# Patient Record
Sex: MALE | HISPANIC OR LATINO | Employment: OTHER | URBAN - METROPOLITAN AREA
[De-identification: names, ages, dates, MRNs, and addresses within clinical notes are randomized per-mention and may not be internally consistent; named-entity substitution may affect disease eponyms.]

---

## 2024-07-31 ENCOUNTER — APPOINTMENT (OUTPATIENT)
Dept: GENERAL RADIOLOGY | Facility: CLINIC | Age: 39
End: 2024-07-31
Attending: EMERGENCY MEDICINE
Payer: MEDICAID

## 2024-07-31 ENCOUNTER — APPOINTMENT (OUTPATIENT)
Dept: CT IMAGING | Facility: CLINIC | Age: 39
End: 2024-07-31
Attending: EMERGENCY MEDICINE
Payer: MEDICAID

## 2024-07-31 ENCOUNTER — APPOINTMENT (OUTPATIENT)
Dept: ULTRASOUND IMAGING | Facility: CLINIC | Age: 39
End: 2024-07-31
Attending: EMERGENCY MEDICINE
Payer: MEDICAID

## 2024-07-31 ENCOUNTER — HOSPITAL ENCOUNTER (INPATIENT)
Facility: CLINIC | Age: 39
LOS: 7 days | Discharge: HOME | End: 2024-08-07
Attending: EMERGENCY MEDICINE | Admitting: FAMILY MEDICINE
Payer: MEDICAID

## 2024-07-31 DIAGNOSIS — M79.642 LEFT HAND PAIN: ICD-10-CM

## 2024-07-31 DIAGNOSIS — J18.9 PARAPNEUMONIC EFFUSION: ICD-10-CM

## 2024-07-31 DIAGNOSIS — J18.9 PNEUMONIA OF RIGHT LOWER LOBE DUE TO INFECTIOUS ORGANISM: Primary | ICD-10-CM

## 2024-07-31 DIAGNOSIS — J91.8 PARAPNEUMONIC EFFUSION: ICD-10-CM

## 2024-07-31 DIAGNOSIS — R05.9 COUGH, UNSPECIFIED TYPE: ICD-10-CM

## 2024-07-31 DIAGNOSIS — A41.9 SEPSIS, DUE TO UNSPECIFIED ORGANISM, UNSPECIFIED WHETHER ACUTE ORGAN DYSFUNCTION PRESENT (H): ICD-10-CM

## 2024-07-31 DIAGNOSIS — R06.02 SHORTNESS OF BREATH: ICD-10-CM

## 2024-07-31 DIAGNOSIS — R07.81 PLEURITIC CHEST PAIN: ICD-10-CM

## 2024-07-31 LAB
ALBUMIN SERPL BCG-MCNC: 3.8 G/DL (ref 3.5–5.2)
ALBUMIN UR-MCNC: 30 MG/DL
ALP SERPL-CCNC: 73 U/L (ref 40–150)
ALT SERPL W P-5'-P-CCNC: 22 U/L (ref 0–70)
ANION GAP SERPL CALCULATED.3IONS-SCNC: 12 MMOL/L (ref 7–15)
APPEARANCE UR: CLEAR
AST SERPL W P-5'-P-CCNC: 17 U/L (ref 0–45)
ATRIAL RATE - MUSE: 92 BPM
BASOPHILS # BLD AUTO: 0 10E3/UL (ref 0–0.2)
BASOPHILS NFR BLD AUTO: 0 %
BILIRUB SERPL-MCNC: 2 MG/DL
BILIRUB UR QL STRIP: NEGATIVE
BUN SERPL-MCNC: 8.2 MG/DL (ref 6–20)
CALCIUM SERPL-MCNC: 8.5 MG/DL (ref 8.8–10.4)
CHLORIDE SERPL-SCNC: 102 MMOL/L (ref 98–107)
COLOR UR AUTO: YELLOW
CREAT SERPL-MCNC: 0.82 MG/DL (ref 0.67–1.17)
DIASTOLIC BLOOD PRESSURE - MUSE: NORMAL MMHG
EGFRCR SERPLBLD CKD-EPI 2021: >90 ML/MIN/1.73M2
EOSINOPHIL # BLD AUTO: 0.5 10E3/UL (ref 0–0.7)
EOSINOPHIL NFR BLD AUTO: 5 %
ERYTHROCYTE [DISTWIDTH] IN BLOOD BY AUTOMATED COUNT: 13.4 % (ref 10–15)
FLUAV RNA SPEC QL NAA+PROBE: NEGATIVE
FLUBV RNA RESP QL NAA+PROBE: NEGATIVE
GLUCOSE SERPL-MCNC: 110 MG/DL (ref 70–99)
GLUCOSE UR STRIP-MCNC: NEGATIVE MG/DL
HCO3 SERPL-SCNC: 21 MMOL/L (ref 22–29)
HCT VFR BLD AUTO: 40.4 % (ref 40–53)
HGB BLD-MCNC: 13.7 G/DL (ref 13.3–17.7)
HGB UR QL STRIP: NEGATIVE
HIV 1+2 AB+HIV1 P24 AG SERPL QL IA: NONREACTIVE
HOLD SPECIMEN: NORMAL
HOLD SPECIMEN: NORMAL
IMM GRANULOCYTES # BLD: 0.1 10E3/UL
IMM GRANULOCYTES NFR BLD: 1 %
INTERPRETATION ECG - MUSE: NORMAL
KETONES UR STRIP-MCNC: NEGATIVE MG/DL
LACTATE SERPL-SCNC: 0.9 MMOL/L (ref 0.7–2)
LEUKOCYTE ESTERASE UR QL STRIP: NEGATIVE
LYMPHOCYTES # BLD AUTO: 0.8 10E3/UL (ref 0.8–5.3)
LYMPHOCYTES NFR BLD AUTO: 7 %
MCH RBC QN AUTO: 30.5 PG (ref 26.5–33)
MCHC RBC AUTO-ENTMCNC: 33.9 G/DL (ref 31.5–36.5)
MCV RBC AUTO: 90 FL (ref 78–100)
MONOCYTES # BLD AUTO: 0.9 10E3/UL (ref 0–1.3)
MONOCYTES NFR BLD AUTO: 7 %
NEUTROPHILS # BLD AUTO: 9.6 10E3/UL (ref 1.6–8.3)
NEUTROPHILS NFR BLD AUTO: 80 %
NITRATE UR QL: NEGATIVE
NRBC # BLD AUTO: 0 10E3/UL
NRBC BLD AUTO-RTO: 0 /100
NT-PROBNP SERPL-MCNC: 94 PG/ML (ref 0–450)
P AXIS - MUSE: 47 DEGREES
PH UR STRIP: 6.5 [PH] (ref 5–7)
PLATELET # BLD AUTO: 332 10E3/UL (ref 150–450)
POTASSIUM SERPL-SCNC: 3.5 MMOL/L (ref 3.4–5.3)
PR INTERVAL - MUSE: 136 MS
PROT SERPL-MCNC: 7.7 G/DL (ref 6.4–8.3)
QRS DURATION - MUSE: 82 MS
QT - MUSE: 362 MS
QTC - MUSE: 447 MS
R AXIS - MUSE: 81 DEGREES
RBC # BLD AUTO: 4.49 10E6/UL (ref 4.4–5.9)
RBC URINE: 0 /HPF
RSV RNA SPEC NAA+PROBE: NEGATIVE
SARS-COV-2 RNA RESP QL NAA+PROBE: NEGATIVE
SODIUM SERPL-SCNC: 135 MMOL/L (ref 135–145)
SP GR UR STRIP: 1 (ref 1–1.03)
SYSTOLIC BLOOD PRESSURE - MUSE: NORMAL MMHG
T AXIS - MUSE: 53 DEGREES
TROPONIN T SERPL HS-MCNC: <6 NG/L
UROBILINOGEN UR STRIP-MCNC: 12 MG/DL
VENTRICULAR RATE- MUSE: 92 BPM
WBC # BLD AUTO: 11.9 10E3/UL (ref 4–11)
WBC URINE: 0 /HPF

## 2024-07-31 PROCEDURE — 99223 1ST HOSP IP/OBS HIGH 75: CPT | Mod: AI

## 2024-07-31 PROCEDURE — 120N000002 HC R&B MED SURG/OB UMMC

## 2024-07-31 PROCEDURE — 84484 ASSAY OF TROPONIN QUANT: CPT | Performed by: EMERGENCY MEDICINE

## 2024-07-31 PROCEDURE — 99291 CRITICAL CARE FIRST HOUR: CPT | Mod: 25 | Performed by: EMERGENCY MEDICINE

## 2024-07-31 PROCEDURE — 85025 COMPLETE CBC W/AUTO DIFF WBC: CPT | Performed by: EMERGENCY MEDICINE

## 2024-07-31 PROCEDURE — 83605 ASSAY OF LACTIC ACID: CPT | Performed by: EMERGENCY MEDICINE

## 2024-07-31 PROCEDURE — 250N000011 HC RX IP 250 OP 636: Performed by: STUDENT IN AN ORGANIZED HEALTH CARE EDUCATION/TRAINING PROGRAM

## 2024-07-31 PROCEDURE — 93005 ELECTROCARDIOGRAM TRACING: CPT | Performed by: EMERGENCY MEDICINE

## 2024-07-31 PROCEDURE — 71275 CT ANGIOGRAPHY CHEST: CPT | Mod: 26 | Performed by: RADIOLOGY

## 2024-07-31 PROCEDURE — 83880 ASSAY OF NATRIURETIC PEPTIDE: CPT | Performed by: EMERGENCY MEDICINE

## 2024-07-31 PROCEDURE — 93010 ELECTROCARDIOGRAM REPORT: CPT | Performed by: EMERGENCY MEDICINE

## 2024-07-31 PROCEDURE — 36415 COLL VENOUS BLD VENIPUNCTURE: CPT | Performed by: EMERGENCY MEDICINE

## 2024-07-31 PROCEDURE — 74177 CT ABD & PELVIS W/CONTRAST: CPT | Mod: 26 | Performed by: RADIOLOGY

## 2024-07-31 PROCEDURE — 82040 ASSAY OF SERUM ALBUMIN: CPT | Performed by: EMERGENCY MEDICINE

## 2024-07-31 PROCEDURE — 250N000011 HC RX IP 250 OP 636

## 2024-07-31 PROCEDURE — 76705 ECHO EXAM OF ABDOMEN: CPT | Mod: 26 | Performed by: STUDENT IN AN ORGANIZED HEALTH CARE EDUCATION/TRAINING PROGRAM

## 2024-07-31 PROCEDURE — 87637 SARSCOV2&INF A&B&RSV AMP PRB: CPT | Performed by: EMERGENCY MEDICINE

## 2024-07-31 PROCEDURE — 86481 TB AG RESPONSE T-CELL SUSP: CPT

## 2024-07-31 PROCEDURE — 96365 THER/PROPH/DIAG IV INF INIT: CPT | Mod: 59 | Performed by: EMERGENCY MEDICINE

## 2024-07-31 PROCEDURE — 71046 X-RAY EXAM CHEST 2 VIEWS: CPT | Mod: 26 | Performed by: RADIOLOGY

## 2024-07-31 PROCEDURE — 87389 HIV-1 AG W/HIV-1&-2 AB AG IA: CPT

## 2024-07-31 PROCEDURE — 71046 X-RAY EXAM CHEST 2 VIEWS: CPT

## 2024-07-31 PROCEDURE — 71275 CT ANGIOGRAPHY CHEST: CPT

## 2024-07-31 PROCEDURE — 87040 BLOOD CULTURE FOR BACTERIA: CPT | Performed by: EMERGENCY MEDICINE

## 2024-07-31 PROCEDURE — 250N000011 HC RX IP 250 OP 636: Performed by: EMERGENCY MEDICINE

## 2024-07-31 PROCEDURE — 36415 COLL VENOUS BLD VENIPUNCTURE: CPT

## 2024-07-31 PROCEDURE — 250N000013 HC RX MED GY IP 250 OP 250 PS 637

## 2024-07-31 PROCEDURE — 250N000013 HC RX MED GY IP 250 OP 250 PS 637: Performed by: EMERGENCY MEDICINE

## 2024-07-31 PROCEDURE — 87086 URINE CULTURE/COLONY COUNT: CPT

## 2024-07-31 PROCEDURE — 258N000003 HC RX IP 258 OP 636

## 2024-07-31 PROCEDURE — 74177 CT ABD & PELVIS W/CONTRAST: CPT

## 2024-07-31 PROCEDURE — 81001 URINALYSIS AUTO W/SCOPE: CPT | Performed by: EMERGENCY MEDICINE

## 2024-07-31 PROCEDURE — 76705 ECHO EXAM OF ABDOMEN: CPT

## 2024-07-31 RX ORDER — ACETAMINOPHEN 650 MG/1
650 SUPPOSITORY RECTAL EVERY 8 HOURS
Status: DISCONTINUED | OUTPATIENT
Start: 2024-07-31 | End: 2024-08-07 | Stop reason: HOSPADM

## 2024-07-31 RX ORDER — AMOXICILLIN 250 MG
2 CAPSULE ORAL 2 TIMES DAILY PRN
Status: DISCONTINUED | OUTPATIENT
Start: 2024-07-31 | End: 2024-08-07 | Stop reason: HOSPADM

## 2024-07-31 RX ORDER — GUAIFENESIN/DEXTROMETHORPHAN 100-10MG/5
10 SYRUP ORAL EVERY 4 HOURS PRN
Status: DISCONTINUED | OUTPATIENT
Start: 2024-07-31 | End: 2024-08-07 | Stop reason: HOSPADM

## 2024-07-31 RX ORDER — ACETAMINOPHEN 325 MG/1
650 TABLET ORAL ONCE
Status: COMPLETED | OUTPATIENT
Start: 2024-07-31 | End: 2024-07-31

## 2024-07-31 RX ORDER — AMOXICILLIN 250 MG
1 CAPSULE ORAL 2 TIMES DAILY PRN
Status: DISCONTINUED | OUTPATIENT
Start: 2024-07-31 | End: 2024-08-07 | Stop reason: HOSPADM

## 2024-07-31 RX ORDER — KETOROLAC TROMETHAMINE 15 MG/ML
15 INJECTION, SOLUTION INTRAMUSCULAR; INTRAVENOUS EVERY 6 HOURS
Status: DISCONTINUED | OUTPATIENT
Start: 2024-07-31 | End: 2024-08-02

## 2024-07-31 RX ORDER — IOPAMIDOL 755 MG/ML
132 INJECTION, SOLUTION INTRAVASCULAR ONCE
Status: COMPLETED | OUTPATIENT
Start: 2024-07-31 | End: 2024-07-31

## 2024-07-31 RX ORDER — ONDANSETRON 4 MG/1
4 TABLET, ORALLY DISINTEGRATING ORAL EVERY 6 HOURS PRN
Status: DISCONTINUED | OUTPATIENT
Start: 2024-07-31 | End: 2024-08-02

## 2024-07-31 RX ORDER — CEFTRIAXONE 2 G/1
2 INJECTION, POWDER, FOR SOLUTION INTRAMUSCULAR; INTRAVENOUS ONCE
Status: COMPLETED | OUTPATIENT
Start: 2024-07-31 | End: 2024-07-31

## 2024-07-31 RX ORDER — ACETAMINOPHEN 325 MG/1
650 TABLET ORAL EVERY 4 HOURS PRN
Status: DISCONTINUED | OUTPATIENT
Start: 2024-07-31 | End: 2024-07-31

## 2024-07-31 RX ORDER — CALCIUM CARBONATE 500 MG/1
1000 TABLET, CHEWABLE ORAL 4 TIMES DAILY PRN
Status: DISCONTINUED | OUTPATIENT
Start: 2024-07-31 | End: 2024-08-07 | Stop reason: HOSPADM

## 2024-07-31 RX ORDER — ONDANSETRON 2 MG/ML
4 INJECTION INTRAMUSCULAR; INTRAVENOUS EVERY 6 HOURS PRN
Status: DISCONTINUED | OUTPATIENT
Start: 2024-07-31 | End: 2024-08-02

## 2024-07-31 RX ORDER — CEFTRIAXONE 2 G/1
2 INJECTION, POWDER, FOR SOLUTION INTRAMUSCULAR; INTRAVENOUS EVERY 24 HOURS
Status: DISCONTINUED | OUTPATIENT
Start: 2024-08-01 | End: 2024-08-01

## 2024-07-31 RX ORDER — AZITHROMYCIN 500 MG/5ML
500 INJECTION, POWDER, LYOPHILIZED, FOR SOLUTION INTRAVENOUS EVERY 24 HOURS
Status: DISCONTINUED | OUTPATIENT
Start: 2024-08-01 | End: 2024-08-01

## 2024-07-31 RX ORDER — ALBUTEROL SULFATE 0.83 MG/ML
2.5 SOLUTION RESPIRATORY (INHALATION) EVERY 4 HOURS PRN
Status: DISCONTINUED | OUTPATIENT
Start: 2024-07-31 | End: 2024-08-02

## 2024-07-31 RX ORDER — AZITHROMYCIN 250 MG/1
500 TABLET, FILM COATED ORAL ONCE
Status: COMPLETED | OUTPATIENT
Start: 2024-07-31 | End: 2024-07-31

## 2024-07-31 RX ORDER — ACETAMINOPHEN 325 MG/1
975 TABLET ORAL EVERY 8 HOURS
Status: DISCONTINUED | OUTPATIENT
Start: 2024-07-31 | End: 2024-08-07 | Stop reason: HOSPADM

## 2024-07-31 RX ORDER — LIDOCAINE 40 MG/G
CREAM TOPICAL
Status: DISCONTINUED | OUTPATIENT
Start: 2024-07-31 | End: 2024-08-07 | Stop reason: HOSPADM

## 2024-07-31 RX ADMIN — KETOROLAC TROMETHAMINE 15 MG: 15 INJECTION, SOLUTION INTRAMUSCULAR; INTRAVENOUS at 22:11

## 2024-07-31 RX ADMIN — AZITHROMYCIN DIHYDRATE 500 MG: 250 TABLET ORAL at 16:25

## 2024-07-31 RX ADMIN — CEFTRIAXONE SODIUM 2 G: 2 INJECTION, POWDER, FOR SOLUTION INTRAMUSCULAR; INTRAVENOUS at 12:56

## 2024-07-31 RX ADMIN — IOPAMIDOL 132 ML: 755 INJECTION, SOLUTION INTRAVENOUS at 14:53

## 2024-07-31 RX ADMIN — ACETAMINOPHEN 650 MG: 325 TABLET, FILM COATED ORAL at 11:43

## 2024-07-31 RX ADMIN — SODIUM CHLORIDE 1000 ML: 9 INJECTION, SOLUTION INTRAVENOUS at 22:12

## 2024-07-31 RX ADMIN — ACETAMINOPHEN 975 MG: 325 TABLET, FILM COATED ORAL at 22:10

## 2024-07-31 ASSESSMENT — ACTIVITIES OF DAILY LIVING (ADL)
ADLS_ACUITY_SCORE: 35
ADLS_ACUITY_SCORE: 35
ADLS_ACUITY_SCORE: 18
ADLS_ACUITY_SCORE: 35
ADLS_ACUITY_SCORE: 18
ADLS_ACUITY_SCORE: 35
ADLS_ACUITY_SCORE: 18
ADLS_ACUITY_SCORE: 35

## 2024-07-31 ASSESSMENT — COLUMBIA-SUICIDE SEVERITY RATING SCALE - C-SSRS
2. HAVE YOU ACTUALLY HAD ANY THOUGHTS OF KILLING YOURSELF IN THE PAST MONTH?: NO
1. IN THE PAST MONTH, HAVE YOU WISHED YOU WERE DEAD OR WISHED YOU COULD GO TO SLEEP AND NOT WAKE UP?: NO
6. HAVE YOU EVER DONE ANYTHING, STARTED TO DO ANYTHING, OR PREPARED TO DO ANYTHING TO END YOUR LIFE?: NO

## 2024-07-31 NOTE — LETTER
August 7, 2024      Ramón Cruz  [address]  [place] [address]        Dear ,    We are writing to inform you of your test results.    Your test results fall within the expected range(s) or remain unchanged from previous results.  Please continue with current treatment plan and follow up with your PCP.    Resulted Orders   Fungal Antibodies   Result Value Ref Range    Blastomyces Ria by EIA 0.1 <=0.9 IV      Comment:      INTERPRETIVE INFORMATION: Blastomyces Antibodies EIA, SER    0.9 IV or less.......Negative  1.0-1.4 IV...........Equivocal   1.5 IV or greater....Positive    Aspergillus Antibody by CF <1:8 <1:8      Comment:      INTERPRETIVE INFORMATION: Aspergillus Antibodies by CF     A titer of 1:8 or greater suggests Aspergillus infection or   allergy.  Cross-reactions with dimorphic fungi are not   unusual within the genus Aspergillus.  Performed By: [facility]  [address] [place] [address]  : Adonay Barros MD, PhD  CLIA Number: [id number]    Coccidioides Antibody by CF <1:2 <1:2      Comment:      INTERPRETIVE INFORMATION: Coccidioides Ab by Complement   Fixation (CF)    A titer of 1:2 or greater suggests past or current   infection. However, greater than 30 percent of cases with   chronic residual pulmonary disease have negative complement   fixation (CF) tests. Titers of less than 1:32 (even as low   as 1:2) may indicate past infection or self-limited   disease; anticoccidioidal CF antibody titers in excess of   1:16 may indicate disseminated infection. CF serology may   be used to follow therapy. Antibody in CSF is considered   diagnostic for coccidioidal meningitis, although 10 percent   of patients with coccidioidal meningitis will not have   antibody in CSF.    Histoplasma Mycelia, CF <1:8 <1:8      Comment:      INTERPRETIVE INFORMATION: Histoplasma Mycelia Antibodies                            by CF  A titer of 1:8 or greater is  generally considered   presumptive evidence of histoplasmosis. A titer of 1:32 or   greater or rising titers indicate strong presumptive   evidence of histoplasmosis. Cross reactions, usually at   lower titers, may occur with other fungal diseases.    Histoplasma Yeast, CF <1:8 <1:8      Comment:      INTERPRETIVE INFORMATION: Histoplasma Yeast Antibodies                             by CF  A titer of 1:8 or greater is generally considered   presumptive evidence of histoplasmosis. A titer of 1:32 or   greater or rising titers indicate strong presumptive   evidence of histoplasmosis. Cross reactions, usually at   lower titers, may occur with other fungal diseases.   Pleural fluid Aerobic Bacterial Culture Routine With Gram Stain   Result Value Ref Range    Culture No growth after 1 day     Gram Stain Result 2+ Gram positive cocci (A)     Gram Stain Result 4+ WBC seen (A)       Comment:      Predominantly PMNs       If you have any questions or concerns, please call the clinic at the number listed above.       Sincerely,      Katherin Whaley MD

## 2024-07-31 NOTE — ED TRIAGE NOTES
Pt is Turkish speaking only arriving to ED via EMS diaphoretic and temp of 100.4 and increased SOB. PT requires 4L NC to maintain saturations at 95%, 90% and tachypnea on RA. PT states he is having 10/10 back pain and L hand pain. Necrosis noted on first finger on L hand where pt states he had a laceration a month ago that pt states he received sutures for.     EMS admin 100mcg of fentanyl IV.      Triage Assessment (Adult)       Row Name 07/31/24 1111          Triage Assessment    Airway WDL WDL        Respiratory WDL    Respiratory WDL X;rhythm/pattern     Rhythm/Pattern, Respiratory shortness of breath        Skin Circulation/Temperature WDL    Skin Circulation/Temperature WDL WDL        Cardiac WDL    Cardiac WDL X     Cardiac Rhythm ST        Peripheral/Neurovascular WDL    Peripheral Neurovascular WDL WDL        Cognitive/Neuro/Behavioral WDL    Cognitive/Neuro/Behavioral WDL WDL

## 2024-07-31 NOTE — H&P
[facility]    History and Physical - Hospitalist Service     Date of Admission:  7/31/2024    Assessment & Plan      Ramón Cruz is a 39 year old male admitted on 7/31/2024. He has no pertinent PMH and was admitted for 2 day hx of SOB, CP, neck pain, back pain, fever, and dry cough.     #CAP  #Acute hypoxic respiratory failure 2/2 CAP  Chest CT w/ small R pleural effusion & BL opacities, likely representing multifocal PNA & small loculated right pleural effusion. Pt w/ dry cough, SOB 2/2 to pleuritic CP, hypoxia, neck & upper back pain (9/10). Workup on admission also notable for mild leukocytosis to 11.9, mild non anion gap metabolic acidosis (possibly compensatory from tachypnea). Workup otherwise unremarkable-- electrolytes WNL, hgb WNL and stable, lactic acid WNL, non infectious UA, COVID/flu/RSV negative. EKG w/o clear ischemic changes, trop WNL. In the ED, pt required 4-6L supplemental O2, received azithromycin and ceftriaxone. VS wnl except temp 100.4 F in the ED. Received ceftriaxone and azithromycin.    Antibiotics  - ceftriaxone 2g IV daily (7/31-P)  - azithromycin 500mg IV daily (7/31-P)    Workup  - Blood culture  - TB quant gold  - HIV  - culture sputum IF producing sputum  (Consider ECHO if +ve blood cx)    Monitor  - daily CBC & CMP  - vitals q8h    Management  - Fluids: s/p 1L NS bolus, now on maintenance fluids NS at 130ml/hr  - Pain: tylenol 975mg q8h PO, ketorolac 15mg IV q6h  - SOB: supplemental O2, continuous pulse oximetry, albuterol neb q4h PRN  - Cough: robitussin 10ml q4h PRN    Consults  Consider IR for possible chest tube placement (for the loculated pleural effusion)    #Pyelonephritis, presumed  CT abdomen pelvis showed cortical wedge-shaped hypoattenuation of kidneys b/l concerning for pyelonephritis. Dark & malodorous urine per pt, but UA remarkable for albumin and urobilinogen. No pyuria or bacteriuria.     Workup  - Follow up urine  "culture      Management  - Ceftriaxone 2g IV daily (treating both CAP & presumed pyelo)    #Hyperbilirubinemia  #H/o elevated LFTs  During April visit to ED,  and AST 58, normal bili. This admission, bilirubin is 2.0, other LFTs unremarkable. Abdominal US not able to visualized the gallbladder, cholecystectomy ~3 years ago.     #L thumb wound, suspicious for necrosis  Trauma to the thumb ~1 month ago, got stitches. No erythema or tenderness, nailbed intact. Black, necrotic tissue at the tip of the thumb. No management necessary as of now.     #H/o cocaine use  Documentation from April 2024 noting screen positives for cocaine w/ associated confusion. No current use.       Diet: Regular   DVT Prophylaxis: Pneumatic Compression Devices  Leos Catheter: Not present  Fluids: s/p 1L NS bolus, now on maintenance fluids NS at 130ml/hr  Lines: PRESENT - PIV   Cardiac Monitoring: None  Code Status:  Full Code    Disposition Plan   Medically Ready for Discharge: Anticipated in 2-4 Days    The patient's care was discussed with the Attending Physician, Dr. Choi .    Lety Graves MD  [facility]  [facility]  _________________________________________________________________  Chief Complaint   Fever, cough, back & neck pain    History is obtained from the patient with the help of an .     History of Present Illness   Ramón Cruz is a 39 year old male with no pertinent PMH who presented on 7/31/24 for 2 day hx of SOB, CP, neck pain, back pain, fever, and dry cough.     Pt reports that about 3 days ago he started experiencing HA, neck pain, which spread to his shoulders and upper back and progressively got worse (9/10). He also started experiencing dry cough and chest pain, which made is difficulty for him to breath normally. Took OTC cough medicine but didn't help much. Also took OTC \"liver medicine\" as pt believed that he had pain over " "his liver (right mid-back and right mid-abdomen. Also endorsed subjective fevers and chills. Had 6-8 episodes of non-bloody, watery diarrhea on Tuesday, after his initial symptoms of pain, cough, and SOB began. Has not eaten much since then. Also reported dark yellow, malodorous urine, \"smelled like medicine,\" but no dysuria.     Additionally, pt injured his left thumb about a month ago, accidentally hit it with a hammer. He got multiple stitches, but endorses that his thumb has healed well since then. Denies pain, swelling, numbness, tingling over left thumb and hand.     ED Course: VS: T 100.4 F, /66, , RR 22, O2 94% on RA. Mild leukocytosis to 11.9, mild non anion gap metabolic acidosis (possibly compensatory from tachypnea). Workup otherwise unremarkable-- electrolytes WNL, hgb WNL and stable, lactic acid WNL, non infectious UA, COVID/flu/RSV negative. EKG w/o clear ischemic changes, trop WNL. In the ED, pt required 4-6L supplemental O2, Chest CT w/ small R pleural effusion & BL opacities, received azithromycin and ceftriaxone.      Past Medical History    None.     Past Surgical History   Cholecystectomy ~3 years ago    Prior to Admission Medications   None     Social History   - Works as a   - Traveled to [place] 3 months ago to visit family  - Started smoking 2 cig/ day 17 years ago, drinks a box of beer (24 cans) every other week, smoked a new drug about a month ago - can't recall name.     Family History   No known problems    Allergies   No Known Allergies     Physical Exam   Vital Signs: Temp: 98.5  F (36.9  C) Temp src: Oral BP: 111/76 Pulse: 67   Resp: 16 SpO2: 96 % O2 Device: Nasal cannula Oxygen Delivery: (S) 4 LPM  Weight: 0 lbs 0 oz    Constitutional: awake, alert, cooperative, uncomfortable appearing  Respiratory: 4L O2 via nasal cannula, limited air movement, tender to palpation over chest, clear to auscultation bilaterally, no crackles or wheezing,   Cardiovascular: RRR, normal " S1 and S2, no S3 or S4, and no murmur noted  GI: soft, non-distended, non-tender  Extremity: necrotic tip of left thumb, no surrounding erythema, swelling, or tenderness    Data     I have personally reviewed the following data over the past 24 hrs:    11.9 (H)  \   13.7   / 332     135 102 8.2 /  110 (H)   3.5 21 (L) 0.82 \     ALT: 22 AST: 17 AP: 73 TBILI: 2.0 (H)   ALB: 3.8 TOT PROTEIN: 7.7 LIPASE: N/A     Trop: <6 BNP: 94     Procal: N/A CRP: N/A Lactic Acid: 0.9         Imaging results reviewed over the past 24 hrs:   Recent Results (from the past 24 hour(s))   XR Chest 2 Views    Narrative    Chest 2 views    INDICATION: Short of breath, chest pain    COMPARISON: None    FINDINGS: Low inspiratory volume. Heart is borderline enlarged. Patchy  densities in the lower lungs and midlung fields suggestive of edema.  Cosmetic angles are not entirely sharp and may indicate trace pleural  effusions or other edema as well. Bony structures appear grossly  intact.      Impression    IMPRESSION: Suggestion of pulmonary edema which may be cardiogenic in  etiology.    NICK HARRINGTON MD         SYSTEM ID:  [id number]   US Abdomen Limited    Narrative    EXAMINATION: Limited Abdominal Ultrasound, 7/31/2024 2:42 PM     COMPARISON: None.    HISTORY: r/o cholecystitis, according to technologist patient had  gallbladder surgery at [facility]    FINDINGS:     Fluid: Right lower lung consolidation/atelectasis partially  visualized.    Liver: The liver demonstrates normal echotexture, measuring 15.7 cm in  craniocaudal dimension. There is no focal mass. Main portal vein is  patent with antegrade flow    Gallbladder: Not visualized.    Bile Ducts: No intrahepatic biliary ductal dilatation demonstrated.   The visualized common bile duct measures 5 mm in diameter.    Pancreas: Pancreas is partially obscured. Visualized portions of the  head and body of the pancreas are unremarkable.     Kidney: The right kidney measures 12 cm long.  There is no  hydronephrosis or hydroureter, no shadowing renal calculi, cystic  lesion or mass.     Aorta and IVC: Proximal aorta and IVC are within normal limits.      Impression    IMPRESSION:     1. Gallbladder not visualized, possibly post cholecystectomy  2. Right lower lung consolidation/atelectasis partially visualized.    ORLANDO PINO MD         SYSTEM ID:  [id number]   CT Chest Pulmonary Embolism w Contrast    Narrative    EXAMINATION: CTA pulmonary angiogram, 7/31/2024 3:08 PM     COMPARISON: None.    HISTORY: Pleuritic chest pain, fever    TECHNIQUE: Volumetric helical acquisition of CT images of the chest  from the lung apices to the kidneys were acquired after the  administration of 80 mL of Isovue-370 IV contrast. Post-processed  multiplanar and/or MIP reformations were obtained, archived to PACS  and used in interpretation of this study.     FINDINGS:      Contrast bolus is: excellent.  Exam is negative for acute pulmonary  embolism. No CT evidence of right heart strain. The main pulmonary  artery is normal caliber measuring up to 2.9 cm.    Lungs: Central airways are patent. Scattered consolidative and streaky  opacities throughout all lobes. Scattered right greater than left  intralobular septal thickening and    Mediastinum: Borderline cardiomegaly. No pericardial effusion. Normal  caliber of the ascending thoracic aorta. No mediastinal  lymphadenopathy. Normal thyroid. Normal esophagus.    Upper abdomen: No acute abnormality.    Bones/Soft Tissues: No acute osseous abnormalities or suspicious bony  lesions. Soft tissues are unremarkable.      Impression    IMPRESSION:   1. No pulmonary embolism.  2. Scattered consolidative opacities likely representing a combination  of multifocal pneumonia and atelectasis.  3. Small loculated right pleural effusion.    I have personally reviewed the examination and initial interpretation  and I agree with the findings.    NICK HARRINGTON MD         SYSTEM  ID:  [id number]   CT Abdomen Pelvis w Contrast    Narrative    Examination: CT ABDOMEN PELVIS W CONTRAST, 7/31/2024 3:09 PM    Technique:  Helical CT images from the lung bases through the  symphysis pubis were obtained with contrast.  Coronal reformatted  images were generated at a workstation for further assessment.    Contrast:  132 mL Isovue-370    Comparison: Ultrasound same day, CT chest same day.    History: Right-sided flank pain fever    Findings:    Abdomen and pelvis:   Liver: No suspicious liver lesions.   Gallbladder: Surgically absent.   Spleen: Normal size.  Pancreas: No suspicious pancreatic lesions. Subcentimeter  hypoattenuating foci in the pancreatic head probably represent  interdigitating fat. The pancreatic duct is not dilated.  Adrenal glands: No adrenal nodules.  Urinary system: Cortical wedge-shaped/linear hypoattenuating regions  predominantly in the mid to lower poles of the kidneys bilaterally. No  kidney masses. No hydronephrosis, hydroureter, or obstructing renal  stones. Urinary bladder is unremarkable.  Reproductive organs: Unremarkable.  Bowel: No abnormally dilated loops of large or small bowel. No  abnormal bowel wall thickening or enhancement. Colonic diverticulosis  without evidence of acute diverticulitis. The appendix is  unremarkable.  Lymph nodes: No retroperitoneal, mesenteric, or pelvic  lymphadenopathy.  Fluid: No free fluid within the abdomen.  Vessels: No infrarenal aortic aneurysm. The portal, superior  mesenteric, and splenic veins are patent.    Lung bases: Consolidation in the right lower lobe. Streaky left  basilar consolidation. Please see same day dedicated chest CT for  dictation of thoracic findings.    Bones and soft tissues: No suspicious osseous lesions.      Impression    Impression:   1.  Cortical wedge-shaped/linear hypoattenuating regions predominantly  in the mid to lower poles of the kidneys bilaterally, concerning for  pyelonephritis.  2.  Consolidation  in the right lower lobe. Streaky consolidation in  the left lower lobe. Please see same-day dedicated chest CT for  dictation of thoracic findings.    I have personally reviewed the examination and initial interpretation  and I agree with the findings.    TAB FAGAN MD         SYSTEM ID:  [id number]

## 2024-07-31 NOTE — ED TRIAGE NOTES
Triage Assessment (Adult)       Row Name 07/31/24 1111          Triage Assessment    Airway WDL WDL        Respiratory WDL    Respiratory WDL X;rhythm/pattern     Rhythm/Pattern, Respiratory shortness of breath        Skin Circulation/Temperature WDL    Skin Circulation/Temperature WDL WDL        Cardiac WDL    Cardiac WDL X     Cardiac Rhythm ST        Peripheral/Neurovascular WDL    Peripheral Neurovascular WDL WDL        Cognitive/Neuro/Behavioral WDL    Cognitive/Neuro/Behavioral WDL WDL

## 2024-07-31 NOTE — ED PROVIDER NOTES
[place] EMERGENCY DEPARTMENT ([facility])    7/31/24       ED PROVIDER NOTE   Vertical triage D  11:29 AM   History     Chief Complaint   Patient presents with    Shortness of Breath    Fever     The history is provided by the patient, medical records and the EMS personnel.     Ramón Cruz is a 39 year old Scottish-speaking male who presents to the emergency department via EMS with neck pain, back pain, fever, cough, shortness of breath developing over the past 1-2 days. Patient states symptoms started 2 days ago with a little bit of pain to back of his neck, then radiated down into his back. Yesterday he developed high fever, cough with phlegm. He feels like he has pain to the side of his lungs and continues to feel short of breath at this time. He endorses dry throat. He tried a cough syrup for this. Took over-the-counter pain medications at 0730 this morning. Paramedics report that he was diaphoretic with a temp of 100.4  F and increased shortness of breath O2 sats at 90% on room air and tachypnea.  He required 4L NC to maintain saturations at 95%. He reported severe 10/10 back pain and left hand pain.  He was given 100 mcg of fentanyl IV.  Medics noted necrotic left thumb.  Regarding his left thumb he smashed his left thumb with a hammer, was given 8 stitches. Doesn't recall where he was seen for sutures, states he was taken there emergently. No pain in left hand currently. No chest pain. No history of DVT/PE. No history of cardiac disease. No history of abdominal issues. No abdominal pain. No pain with urination. He notes his voids are normal, with yellow urine. He has never had this happen before. He is otherwise healthy.     Past Medical History  No past medical history on file.  No past surgical history on file.  No current outpatient medications on file.    No Known Allergies  Family History  No family history on file.  Social History         A medically appropriate review of systems was  performed with pertinent positives and negatives noted in the HPI, and all other systems negative.    Physical Exam   BP: 127/66  Pulse: 107  Temp: 100.4  F (38  C)  Resp: 22  SpO2: 94 %    Physical Exam  General: awake, alert, diaphoretic and short of breath, tachypneic, diaphoretic  Head: normal cephalic  HEENT: pupils equal, conjugate gaze intact  Neck: Supple  CV: Tachycardic  Lungs: clear to auscultation tachypneic, shallow respirations decreased lung sounds diffusely.  Abd: soft, non-tender, no guarding, no peritoneal signs  EXT: Extremity: Patient has a necrotic tip to his left thumb but no surrounding redness or swelling.  Neuro: awake, answers questions appropriately. No focal deficits noted      ED Course, Procedures, & Data      Procedures            EKG Interpretation:      Interpreted by Mingo Bajwa MD  Time reviewed: 1310  Symptoms at time of EKG: sespsis   Rhythm: normal sinus   Rate: 92 bpm  Axis: Normal  Ectopy: none  Conduction: normal  ST Segments/ T Waves: No ST-T wave changes  Q Waves: none  Comparison to prior: No old EKG available    Clinical Impression: normal EKG     Results for orders placed or performed during the hospital encounter of 07/31/24   XR Chest 2 Views     Status: None    Narrative    Chest 2 views    INDICATION: Short of breath, chest pain    COMPARISON: None    FINDINGS: Low inspiratory volume. Heart is borderline enlarged. Patchy  densities in the lower lungs and midlung fields suggestive of edema.  Cosmetic angles are not entirely sharp and may indicate trace pleural  effusions or other edema as well. Bony structures appear grossly  intact.      Impression    IMPRESSION: Suggestion of pulmonary edema which may be cardiogenic in  etiology.    NICK HARRINGTON MD         SYSTEM ID:  [id number]   CT Chest Pulmonary Embolism w Contrast     Status: None    Narrative    EXAMINATION: CTA pulmonary angiogram, 7/31/2024 3:08 PM     COMPARISON: None.    HISTORY: Pleuritic  chest pain, fever    TECHNIQUE: Volumetric helical acquisition of CT images of the chest  from the lung apices to the kidneys were acquired after the  administration of 80 mL of Isovue-370 IV contrast. Post-processed  multiplanar and/or MIP reformations were obtained, archived to PACS  and used in interpretation of this study.     FINDINGS:      Contrast bolus is: excellent.  Exam is negative for acute pulmonary  embolism. No CT evidence of right heart strain. The main pulmonary  artery is normal caliber measuring up to 2.9 cm.    Lungs: Central airways are patent. Scattered consolidative and streaky  opacities throughout all lobes. Scattered right greater than left  intralobular septal thickening and    Mediastinum: Borderline cardiomegaly. No pericardial effusion. Normal  caliber of the ascending thoracic aorta. No mediastinal  lymphadenopathy. Normal thyroid. Normal esophagus.    Upper abdomen: No acute abnormality.    Bones/Soft Tissues: No acute osseous abnormalities or suspicious bony  lesions. Soft tissues are unremarkable.      Impression    IMPRESSION:   1. No pulmonary embolism.  2. Scattered consolidative opacities likely representing a combination  of multifocal pneumonia and atelectasis.  3. Small loculated right pleural effusion.    I have personally reviewed the examination and initial interpretation  and I agree with the findings.    NICK HARRINGTON MD         SYSTEM ID:  [id number]   CT Abdomen Pelvis w Contrast     Status: None    Narrative    Examination: CT ABDOMEN PELVIS W CONTRAST, 7/31/2024 3:09 PM    Technique:  Helical CT images from the lung bases through the  symphysis pubis were obtained with contrast.  Coronal reformatted  images were generated at a workstation for further assessment.    Contrast:  132 mL Isovue-370    Comparison: Ultrasound same day, CT chest same day.    History: Right-sided flank pain fever    Findings:    Abdomen and pelvis:   Liver: No suspicious liver lesions.    Gallbladder: Surgically absent.   Spleen: Normal size.  Pancreas: No suspicious pancreatic lesions. Subcentimeter  hypoattenuating foci in the pancreatic head probably represent  interdigitating fat. The pancreatic duct is not dilated.  Adrenal glands: No adrenal nodules.  Urinary system: Cortical wedge-shaped/linear hypoattenuating regions  predominantly in the mid to lower poles of the kidneys bilaterally. No  kidney masses. No hydronephrosis, hydroureter, or obstructing renal  stones. Urinary bladder is unremarkable.  Reproductive organs: Unremarkable.  Bowel: No abnormally dilated loops of large or small bowel. No  abnormal bowel wall thickening or enhancement. Colonic diverticulosis  without evidence of acute diverticulitis. The appendix is  unremarkable.  Lymph nodes: No retroperitoneal, mesenteric, or pelvic  lymphadenopathy.  Fluid: No free fluid within the abdomen.  Vessels: No infrarenal aortic aneurysm. The portal, superior  mesenteric, and splenic veins are patent.    Lung bases: Consolidation in the right lower lobe. Streaky left  basilar consolidation. Please see same day dedicated chest CT for  dictation of thoracic findings.    Bones and soft tissues: No suspicious osseous lesions.      Impression    Impression:   1.  Cortical wedge-shaped/linear hypoattenuating regions predominantly  in the mid to lower poles of the kidneys bilaterally, concerning for  pyelonephritis.  2.  Consolidation in the right lower lobe. Streaky consolidation in  the left lower lobe. Please see same-day dedicated chest CT for  dictation of thoracic findings.    I have personally reviewed the examination and initial interpretation  and I agree with the findings.    TAB FAGAN MD         SYSTEM ID:  [id number]   US Abdomen Limited     Status: None    Narrative    EXAMINATION: Limited Abdominal Ultrasound, 7/31/2024 2:42 PM     COMPARISON: None.    HISTORY: r/o cholecystitis, according to technologist patient  had  gallbladder surgery at [facility]    FINDINGS:     Fluid: Right lower lung consolidation/atelectasis partially  visualized.    Liver: The liver demonstrates normal echotexture, measuring 15.7 cm in  craniocaudal dimension. There is no focal mass. Main portal vein is  patent with antegrade flow    Gallbladder: Not visualized.    Bile Ducts: No intrahepatic biliary ductal dilatation demonstrated.   The visualized common bile duct measures 5 mm in diameter.    Pancreas: Pancreas is partially obscured. Visualized portions of the  head and body of the pancreas are unremarkable.     Kidney: The right kidney measures 12 cm long. There is no  hydronephrosis or hydroureter, no shadowing renal calculi, cystic  lesion or mass.     Aorta and IVC: Proximal aorta and IVC are within normal limits.      Impression    IMPRESSION:     1. Gallbladder not visualized, possibly post cholecystectomy  2. Right lower lung consolidation/atelectasis partially visualized.    ROLANDO PINO MD         SYSTEM ID:  [id number]   [place] Draw *Canceled*     Status: None ()    Narrative    The following orders were created for panel order [place] Draw.  Procedure                               Abnormality         Status                     ---------                               -----------         ------                       Please view results for these tests on the individual orders.   Comprehensive metabolic panel     Status: Abnormal   Result Value Ref Range    Sodium 135 135 - 145 mmol/L    Potassium 3.5 3.4 - 5.3 mmol/L    Carbon Dioxide (CO2) 21 (L) 22 - 29 mmol/L    Anion Gap 12 7 - 15 mmol/L    Urea Nitrogen 8.2 6.0 - 20.0 mg/dL    Creatinine 0.82 0.67 - 1.17 mg/dL    GFR Estimate >90 >60 mL/min/1.73m2    Calcium 8.5 (L) 8.8 - 10.4 mg/dL    Chloride 102 98 - 107 mmol/L    Glucose 110 (H) 70 - 99 mg/dL    Alkaline Phosphatase 73 40 - 150 U/L    AST 17 0 - 45 U/L    ALT 22 0 - 70 U/L    Protein Total 7.7 6.4 - 8.3 g/dL    Albumin 3.8 3.5 -  5.2 g/dL    Bilirubin Total 2.0 (H) <=1.2 mg/dL   Lactic acid whole blood with 1x repeat in 2 hr when >2     Status: Normal   Result Value Ref Range    Lactic Acid, Initial 0.9 0.7 - 2.0 mmol/L   [place] Draw     Status: None    Narrative    The following orders were created for panel order [place] Draw.  Procedure                               Abnormality         Status                     ---------                               -----------         ------                     Extra Blood Culture Bottle[[id number]]                       Final result                 Please view results for these tests on the individual orders.   Extra Blood Culture Bottle     Status: None   Result Value Ref Range    Hold Specimen JIC    CBC with platelets and differential     Status: Abnormal   Result Value Ref Range    WBC Count 11.9 (H) 4.0 - 11.0 10e3/uL    RBC Count 4.49 4.40 - 5.90 10e6/uL    Hemoglobin 13.7 13.3 - 17.7 g/dL    Hematocrit 40.4 40.0 - 53.0 %    MCV 90 78 - 100 fL    MCH 30.5 26.5 - 33.0 pg    MCHC 33.9 31.5 - 36.5 g/dL    RDW 13.4 10.0 - 15.0 %    Platelet Count 332 150 - 450 10e3/uL    % Neutrophils 80 %    % Lymphocytes 7 %    % Monocytes 7 %    % Eosinophils 5 %    % Basophils 0 %    % Immature Granulocytes 1 %    NRBCs per 100 WBC 0 <1 /100    Absolute Neutrophils 9.6 (H) 1.6 - 8.3 10e3/uL    Absolute Lymphocytes 0.8 0.8 - 5.3 10e3/uL    Absolute Monocytes 0.9 0.0 - 1.3 10e3/uL    Absolute Eosinophils 0.5 0.0 - 0.7 10e3/uL    Absolute Basophils 0.0 0.0 - 0.2 10e3/uL    Absolute Immature Granulocytes 0.1 <=0.4 10e3/uL    Absolute NRBCs 0.0 10e3/uL   Symptomatic Influenza A/B, RSV, & SARS-CoV2 PCR (COVID-19) Nasopharyngeal     Status: Normal    Specimen: Nasopharyngeal; Swab   Result Value Ref Range    Influenza A PCR Negative Negative    Influenza B PCR Negative Negative    RSV PCR Negative Negative    SARS CoV2 PCR Negative Negative    Narrative    Testing was performed using the Xpert Xpress CoV2/Flu/RSV Assay  on the [employer] GeneXpert Instrument. This test should be ordered for the detection of SARS-CoV2, influenza, and RSV viruses in individuals with signs and symptoms of respiratory tract infection. This test is for in vitro diagnostic use under the [place] FDA for laboratories certified under CLIA to perform high or moderate complexity testing. This test has been [place] FDA cleared. A negative result does not rule out the presence of PCR inhibitors in the specimen or target RNA in concentration below the limit of detection for the assay. If only one viral target is positive but coinfection with multiple targets is suspected, the sample should be re-tested with another FDA cleared, approved, or authorized test, if coninfection would change clinical management. This test was validated by the [facility]. These laboratories are certified under the Clinical Laboratory Improvement Amendments of 1988 (CLIA-88) as qualified to perfom high complexity laboratory testing.   Troponin T, High Sensitivity     Status: Normal   Result Value Ref Range    Troponin T, High Sensitivity <6 <=22 ng/L   Nt probnp inpatient (BNP)     Status: Normal   Result Value Ref Range    N terminal Pro BNP Inpatient 94 0 - 450 pg/mL   EKG 12-lead, tracing only     Status: None   Result Value Ref Range    Systolic Blood Pressure  mmHg    Diastolic Blood Pressure  mmHg    Ventricular Rate 92 BPM    Atrial Rate 92 BPM    TN Interval 136 ms    QRS Duration 82 ms     ms    QTc 447 ms    P Axis 47 degrees    R AXIS 81 degrees    T Axis 53 degrees    Interpretation ECG       Sinus rhythm  Normal ECG    Unconfirmed report - interpretation of this ECG is computer generated - see medical record for final interpretation  Confirmed by - EMERGENCY ROOM, PHYSICIAN ([id number]),  EMILY DODSON ([id number]) on 7/31/2024 3:04:36 PM     CBC with Platelets & Differential     Status: Abnormal    Narrative    The following orders were created for panel order  CBC with Platelets & Differential.  Procedure                               Abnormality         Status                     ---------                               -----------         ------                     CBC with platelets and d...[[id number]]  Abnormal            Final result                 Please view results for these tests on the individual orders.     Medications   azithromycin (ZITHROMAX) tablet 500 mg (has no administration in time range)   acetaminophen (TYLENOL) tablet 650 mg (650 mg Oral $Given 7/31/24 1143)   cefTRIAXone (ROCEPHIN) 2 g vial to attach to  ml bag for ADULTS or NS 50 ml bag for PEDS (0 g Intravenous Stopped 7/31/24 1333)   sodium chloride (PF) 0.9% PF flush 90 mL (90 mLs Intravenous $Given 7/31/24 1453)   iopamidol (ISOVUE-370) solution 132 mL (132 mLs Intravenous $Given 7/31/24 1453)     Labs Ordered and Resulted from Time of ED Arrival to Time of ED Departure   COMPREHENSIVE METABOLIC PANEL - Abnormal       Result Value    Sodium 135      Potassium 3.5      Carbon Dioxide (CO2) 21 (*)     Anion Gap 12      Urea Nitrogen 8.2      Creatinine 0.82      GFR Estimate >90      Calcium 8.5 (*)     Chloride 102      Glucose 110 (*)     Alkaline Phosphatase 73      AST 17      ALT 22      Protein Total 7.7      Albumin 3.8      Bilirubin Total 2.0 (*)    CBC WITH PLATELETS AND DIFFERENTIAL - Abnormal    WBC Count 11.9 (*)     RBC Count 4.49      Hemoglobin 13.7      Hematocrit 40.4      MCV 90      MCH 30.5      MCHC 33.9      RDW 13.4      Platelet Count 332      % Neutrophils 80      % Lymphocytes 7      % Monocytes 7      % Eosinophils 5      % Basophils 0      % Immature Granulocytes 1      NRBCs per 100 WBC 0      Absolute Neutrophils 9.6 (*)     Absolute Lymphocytes 0.8      Absolute Monocytes 0.9      Absolute Eosinophils 0.5      Absolute Basophils 0.0      Absolute Immature Granulocytes 0.1      Absolute NRBCs 0.0     LACTIC ACID WHOLE BLOOD WITH 1X REPEAT IN 2 HR WHEN >2 -  Normal    Lactic Acid, Initial 0.9     INFLUENZA A/B, RSV, & SARS-COV2 PCR - Normal    Influenza A PCR Negative      Influenza B PCR Negative      RSV PCR Negative      SARS CoV2 PCR Negative     TROPONIN T, HIGH SENSITIVITY - Normal    Troponin T, High Sensitivity <6     NT PROBNP INPATIENT - Normal    N terminal Pro BNP Inpatient 94     BLOOD CULTURE     CT Abdomen Pelvis w Contrast   Final Result   Impression:    1.  Cortical wedge-shaped/linear hypoattenuating regions predominantly   in the mid to lower poles of the kidneys bilaterally, concerning for   pyelonephritis.   2.  Consolidation in the right lower lobe. Streaky consolidation in   the left lower lobe. Please see same-day dedicated chest CT for   dictation of thoracic findings.      I have personally reviewed the examination and initial interpretation   and I agree with the findings.      TAB FAGAN MD            SYSTEM ID:  [id number]      CT Chest Pulmonary Embolism w Contrast   Final Result   IMPRESSION:    1. No pulmonary embolism.   2. Scattered consolidative opacities likely representing a combination   of multifocal pneumonia and atelectasis.   3. Small loculated right pleural effusion.      I have personally reviewed the examination and initial interpretation   and I agree with the findings.      NICK HARRINGTON MD            SYSTEM ID:  [id number]      US Abdomen Limited   Final Result   IMPRESSION:       1. Gallbladder not visualized, possibly post cholecystectomy   2. Right lower lung consolidation/atelectasis partially visualized.      ORLANDO PINO MD            SYSTEM ID:  [id number]      XR Chest 2 Views   Final Result   IMPRESSION: Suggestion of pulmonary edema which may be cardiogenic in   etiology.      NICK HARRINGTON MD            SYSTEM ID:  [id number]             Critical Care Addendum  My initial assessment, based on my review of nursing observations, review of vital signs, focused history, and physical exam, established a  high suspicion that Ramón Cruz has sepsis with indication for early goal-directed therapy, which requires immediate intervention, and therefore he is critically ill.     After the initial assessment, the care team initiated multiple lab tests, initiated IV fluid administration, and initiated medication therapy with ceftriaxone  to provide stabilization care. Due to the critical nature of this patient, I reassessed nursing observations, vital signs, physical exam, mental status, and respiratory status multiple times prior to his disposition.     Time also spent performing documentation, reviewing test results, discussion with consultants, and coordination of care.     Critical care time (excluding teaching time and procedures): 65 minutes.       Assessment & Plan    Ramón Cruz is a 39 year old [place]inyar[place]-speaking male who presents to the emergency department via EMS with neck pain, back pain, fever, cough, shortness of breath developing over the past 1-2 days.     DDx: Sepsis, pneumonia, infected gallbladder, infected renal colic versus pyelonephritis, could also consider noninfectious causes such as PE, pleural effusion, excetra.  Patient does have a dark necrotic appearing tip to her finger I suspect this is more the finger not taking to be reattached.  The finger itself is not swollen tender or erythematous I do not suspect this is source of infection.    Plan: chest x-ray, labs, urinalysis. Will give 650 mg Tylenol, rocephin 2g.     Chest x-ray read as concern for potential cardiogenic pulmonary edema.  EKG shows no signs of acute ischemia, troponin is negative BNP is negative.  Patient does have normal QT interval so we will add on azithromycin to Rocephin.    Given the back pain, the pleuritic nature and the hypoxia I did order CT chest PE study along with abdomen and pelvis to rule out alternative sources for infection get a better examination of the lungs.  Chest x-ray reviewed by me.     Patient's  white count is slightly elevated, elevated neutrophils.  Normal hemoglobin.  Normal lactic acid.    CT imaging shows wedge-shaped linear hypoattenuating regions in the mid to lower poles of the kidneys bilaterally per radiologist this is concerning for pyelonephritis.  UA is still pending.  CT PE study showed no PE but scattered opacities likely representing pneumonia with a small loculated right-sided pleural effusion.    Patient will be admitted for sepsis, new oxygen requirement origin likely lung versus urine.  Patient was given IV ceftriaxone.    While in the ER patient got IV fluids, IV ceftriaxone, azithromycin.  We are still awaiting urine studies.  He also got Tylenol.  Patient is amenable to being admitted to [facility].  Will arrange for admission to [facility] for ongoing treatment of his pneumonia and/or pyelonephritis.  If he does have a documented pyelonephritis in the setting of pneumonia would also consider workup for endocarditis excetra as a reason for multifocal infections.  Will discuss this with hospitalist.    I have reviewed the nursing notes. I have reviewed the findings, diagnosis, plan and need for follow up with the patient.    New Prescriptions    No medications on file       Final diagnoses:   Sepsis, due to unspecified organism, unspecified whether acute organ dysfunction present (H)     I, Nishi Armstrong, am serving as a trained medical scribe to document services personally performed by Mingo Bajwa MD based on the provider's statements to me on July 31, 2024.  This document has been checked and approved by the attending provider.    IMingo MD, was physically present and have reviewed and verified the accuracy of this note documented by Nishi Armstrong, medical scribe.      Mingo Bajwa MD   [facility] EMERGENCY DEPARTMENT  7/31/2024        Mingo Bajwa MD  07/31/24 2808

## 2024-08-01 ENCOUNTER — VIRTUAL VISIT (OUTPATIENT)
Dept: INTERPRETER SERVICES | Facility: CLINIC | Age: 39
End: 2024-08-01
Payer: MEDICAID

## 2024-08-01 ENCOUNTER — APPOINTMENT (OUTPATIENT)
Dept: INTERPRETER SERVICES | Facility: CLINIC | Age: 39
End: 2024-08-01
Payer: MEDICAID

## 2024-08-01 ENCOUNTER — APPOINTMENT (OUTPATIENT)
Dept: GENERAL RADIOLOGY | Facility: CLINIC | Age: 39
End: 2024-08-01
Payer: MEDICAID

## 2024-08-01 LAB
ALBUMIN SERPL BCG-MCNC: 2.9 G/DL (ref 3.5–5.2)
ALBUMIN SERPL BCG-MCNC: 3 G/DL (ref 3.5–5.2)
ALBUMIN SERPL BCG-MCNC: 3.1 G/DL (ref 3.5–5.2)
ALP SERPL-CCNC: 66 U/L (ref 40–150)
ALP SERPL-CCNC: 88 U/L (ref 40–150)
ALP SERPL-CCNC: 95 U/L (ref 40–150)
ALT SERPL W P-5'-P-CCNC: 17 U/L (ref 0–70)
ALT SERPL W P-5'-P-CCNC: 18 U/L (ref 0–70)
ALT SERPL W P-5'-P-CCNC: 19 U/L (ref 0–70)
ANION GAP SERPL CALCULATED.3IONS-SCNC: 12 MMOL/L (ref 7–15)
ANION GAP SERPL CALCULATED.3IONS-SCNC: 12 MMOL/L (ref 7–15)
ANION GAP SERPL CALCULATED.3IONS-SCNC: 13 MMOL/L (ref 7–15)
AST SERPL W P-5'-P-CCNC: 19 U/L (ref 0–45)
AST SERPL W P-5'-P-CCNC: 8 U/L (ref 0–45)
AST SERPL W P-5'-P-CCNC: 8 U/L (ref 0–45)
BACTERIA UR CULT: NO GROWTH
BASOPHILS # BLD AUTO: 0 10E3/UL (ref 0–0.2)
BASOPHILS # BLD AUTO: 0.1 10E3/UL (ref 0–0.2)
BASOPHILS NFR BLD AUTO: 0 %
BASOPHILS NFR BLD AUTO: 0 %
BILIRUB SERPL-MCNC: 0.9 MG/DL
BILIRUB SERPL-MCNC: 0.9 MG/DL
BILIRUB SERPL-MCNC: 1.6 MG/DL
BUN SERPL-MCNC: 10 MG/DL (ref 6–20)
BUN SERPL-MCNC: 10 MG/DL (ref 6–20)
BUN SERPL-MCNC: 9.8 MG/DL (ref 6–20)
CALCIUM SERPL-MCNC: 8.2 MG/DL (ref 8.8–10.4)
CALCIUM SERPL-MCNC: 8.3 MG/DL (ref 8.8–10.4)
CALCIUM SERPL-MCNC: 8.8 MG/DL (ref 8.8–10.4)
CHLORIDE SERPL-SCNC: 101 MMOL/L (ref 98–107)
CHLORIDE SERPL-SCNC: 101 MMOL/L (ref 98–107)
CHLORIDE SERPL-SCNC: 103 MMOL/L (ref 98–107)
CREAT SERPL-MCNC: 0.8 MG/DL (ref 0.67–1.17)
CREAT SERPL-MCNC: 0.83 MG/DL (ref 0.67–1.17)
CREAT SERPL-MCNC: 0.89 MG/DL (ref 0.67–1.17)
CRP SERPL-MCNC: 454.91 MG/L
EGFRCR SERPLBLD CKD-EPI 2021: >90 ML/MIN/1.73M2
EOSINOPHIL # BLD AUTO: 0.7 10E3/UL (ref 0–0.7)
EOSINOPHIL # BLD AUTO: 1.1 10E3/UL (ref 0–0.7)
EOSINOPHIL NFR BLD AUTO: 5 %
EOSINOPHIL NFR BLD AUTO: 6 %
ERYTHROCYTE [DISTWIDTH] IN BLOOD BY AUTOMATED COUNT: 13.5 % (ref 10–15)
ERYTHROCYTE [DISTWIDTH] IN BLOOD BY AUTOMATED COUNT: 13.6 % (ref 10–15)
ERYTHROCYTE [DISTWIDTH] IN BLOOD BY AUTOMATED COUNT: 13.8 % (ref 10–15)
GLUCOSE SERPL-MCNC: 113 MG/DL (ref 70–99)
GLUCOSE SERPL-MCNC: 118 MG/DL (ref 70–99)
GLUCOSE SERPL-MCNC: 163 MG/DL (ref 70–99)
HCO3 SERPL-SCNC: 19 MMOL/L (ref 22–29)
HCO3 SERPL-SCNC: 19 MMOL/L (ref 22–29)
HCO3 SERPL-SCNC: 21 MMOL/L (ref 22–29)
HCT VFR BLD AUTO: 36.4 % (ref 40–53)
HCT VFR BLD AUTO: 37.8 % (ref 40–53)
HCT VFR BLD AUTO: 39.2 % (ref 40–53)
HGB BLD-MCNC: 12.1 G/DL (ref 13.3–17.7)
HGB BLD-MCNC: 12.7 G/DL (ref 13.3–17.7)
HGB BLD-MCNC: 13.3 G/DL (ref 13.3–17.7)
IMM GRANULOCYTES # BLD: 0.1 10E3/UL
IMM GRANULOCYTES # BLD: 0.1 10E3/UL
IMM GRANULOCYTES NFR BLD: 1 %
IMM GRANULOCYTES NFR BLD: 1 %
L PNEUMO1 AG UR QL IA: NEGATIVE
LACTATE SERPL-SCNC: 0.9 MMOL/L (ref 0.7–2)
LACTATE SERPL-SCNC: 1.9 MMOL/L (ref 0.7–2)
LYMPHOCYTES # BLD AUTO: 1 10E3/UL (ref 0.8–5.3)
LYMPHOCYTES # BLD AUTO: 2 10E3/UL (ref 0.8–5.3)
LYMPHOCYTES NFR BLD AUTO: 11 %
LYMPHOCYTES NFR BLD AUTO: 7 %
MCH RBC QN AUTO: 30.6 PG (ref 26.5–33)
MCH RBC QN AUTO: 30.6 PG (ref 26.5–33)
MCH RBC QN AUTO: 31 PG (ref 26.5–33)
MCHC RBC AUTO-ENTMCNC: 33.2 G/DL (ref 31.5–36.5)
MCHC RBC AUTO-ENTMCNC: 33.6 G/DL (ref 31.5–36.5)
MCHC RBC AUTO-ENTMCNC: 33.9 G/DL (ref 31.5–36.5)
MCV RBC AUTO: 91 FL (ref 78–100)
MCV RBC AUTO: 91 FL (ref 78–100)
MCV RBC AUTO: 92 FL (ref 78–100)
MONOCYTES # BLD AUTO: 0.6 10E3/UL (ref 0–1.3)
MONOCYTES # BLD AUTO: 1.3 10E3/UL (ref 0–1.3)
MONOCYTES NFR BLD AUTO: 4 %
MONOCYTES NFR BLD AUTO: 7 %
NEUTROPHILS # BLD AUTO: 12 10E3/UL (ref 1.6–8.3)
NEUTROPHILS # BLD AUTO: 13.5 10E3/UL (ref 1.6–8.3)
NEUTROPHILS NFR BLD AUTO: 75 %
NEUTROPHILS NFR BLD AUTO: 83 %
NRBC # BLD AUTO: 0 10E3/UL
NRBC BLD AUTO-RTO: 0 /100
PLATELET # BLD AUTO: 327 10E3/UL (ref 150–450)
PLATELET # BLD AUTO: 332 10E3/UL (ref 150–450)
PLATELET # BLD AUTO: 360 10E3/UL (ref 150–450)
POTASSIUM SERPL-SCNC: 3.3 MMOL/L (ref 3.4–5.3)
POTASSIUM SERPL-SCNC: 3.8 MMOL/L (ref 3.4–5.3)
POTASSIUM SERPL-SCNC: 3.8 MMOL/L (ref 3.4–5.3)
PROCALCITONIN SERPL IA-MCNC: 1.31 NG/ML
PROT SERPL-MCNC: 6.6 G/DL (ref 6.4–8.3)
PROT SERPL-MCNC: 6.8 G/DL (ref 6.4–8.3)
PROT SERPL-MCNC: 6.8 G/DL (ref 6.4–8.3)
RBC # BLD AUTO: 3.96 10E6/UL (ref 4.4–5.9)
RBC # BLD AUTO: 4.15 10E6/UL (ref 4.4–5.9)
RBC # BLD AUTO: 4.29 10E6/UL (ref 4.4–5.9)
S PNEUM AG SPEC QL: NEGATIVE
SODIUM SERPL-SCNC: 132 MMOL/L (ref 135–145)
SODIUM SERPL-SCNC: 133 MMOL/L (ref 135–145)
SODIUM SERPL-SCNC: 136 MMOL/L (ref 135–145)
TROPONIN T SERPL HS-MCNC: 9 NG/L
WBC # BLD AUTO: 14.5 10E3/UL (ref 4–11)
WBC # BLD AUTO: 15.1 10E3/UL (ref 4–11)
WBC # BLD AUTO: 18.2 10E3/UL (ref 4–11)
WBC # BLD AUTO: ABNORMAL 10*3/UL

## 2024-08-01 PROCEDURE — 84155 ASSAY OF PROTEIN SERUM: CPT

## 2024-08-01 PROCEDURE — 84484 ASSAY OF TROPONIN QUANT: CPT

## 2024-08-01 PROCEDURE — 85027 COMPLETE CBC AUTOMATED: CPT

## 2024-08-01 PROCEDURE — 87899 AGENT NOS ASSAY W/OPTIC: CPT

## 2024-08-01 PROCEDURE — 250N000011 HC RX IP 250 OP 636

## 2024-08-01 PROCEDURE — 71046 X-RAY EXAM CHEST 2 VIEWS: CPT | Mod: 26 | Performed by: RADIOLOGY

## 2024-08-01 PROCEDURE — 250N000013 HC RX MED GY IP 250 OP 250 PS 637

## 2024-08-01 PROCEDURE — 71046 X-RAY EXAM CHEST 2 VIEWS: CPT

## 2024-08-01 PROCEDURE — 120N000002 HC R&B MED SURG/OB UMMC

## 2024-08-01 PROCEDURE — 36415 COLL VENOUS BLD VENIPUNCTURE: CPT

## 2024-08-01 PROCEDURE — 87040 BLOOD CULTURE FOR BACTERIA: CPT

## 2024-08-01 PROCEDURE — 250N000011 HC RX IP 250 OP 636: Mod: JZ

## 2024-08-01 PROCEDURE — 258N000003 HC RX IP 258 OP 636

## 2024-08-01 PROCEDURE — T1013 SIGN LANG/ORAL INTERPRETER: HCPCS | Mod: U4,TEL

## 2024-08-01 PROCEDURE — 83605 ASSAY OF LACTIC ACID: CPT

## 2024-08-01 PROCEDURE — 84145 PROCALCITONIN (PCT): CPT

## 2024-08-01 PROCEDURE — 80053 COMPREHEN METABOLIC PANEL: CPT

## 2024-08-01 PROCEDURE — 86140 C-REACTIVE PROTEIN: CPT

## 2024-08-01 PROCEDURE — 93010 ELECTROCARDIOGRAM REPORT: CPT | Mod: GC | Performed by: INTERNAL MEDICINE

## 2024-08-01 PROCEDURE — 93005 ELECTROCARDIOGRAM TRACING: CPT

## 2024-08-01 PROCEDURE — 84450 TRANSFERASE (AST) (SGOT): CPT

## 2024-08-01 PROCEDURE — 85025 COMPLETE CBC W/AUTO DIFF WBC: CPT

## 2024-08-01 RX ORDER — OXYCODONE HYDROCHLORIDE 5 MG/1
5 TABLET ORAL EVERY 4 HOURS PRN
Status: DISCONTINUED | OUTPATIENT
Start: 2024-08-01 | End: 2024-08-07 | Stop reason: HOSPADM

## 2024-08-01 RX ORDER — PIPERACILLIN SODIUM, TAZOBACTAM SODIUM 4; .5 G/20ML; G/20ML
4.5 INJECTION, POWDER, LYOPHILIZED, FOR SOLUTION INTRAVENOUS EVERY 6 HOURS
Status: DISCONTINUED | OUTPATIENT
Start: 2024-08-01 | End: 2024-08-06

## 2024-08-01 RX ORDER — NALOXONE HYDROCHLORIDE 0.4 MG/ML
0.2 INJECTION, SOLUTION INTRAMUSCULAR; INTRAVENOUS; SUBCUTANEOUS
Status: DISCONTINUED | OUTPATIENT
Start: 2024-08-01 | End: 2024-08-07 | Stop reason: HOSPADM

## 2024-08-01 RX ORDER — VANCOMYCIN HYDROCHLORIDE
1500 EVERY 12 HOURS
Status: DISCONTINUED | OUTPATIENT
Start: 2024-08-01 | End: 2024-08-03

## 2024-08-01 RX ORDER — IPRATROPIUM BROMIDE AND ALBUTEROL SULFATE 2.5; .5 MG/3ML; MG/3ML
3 SOLUTION RESPIRATORY (INHALATION) EVERY 4 HOURS PRN
Status: DISCONTINUED | OUTPATIENT
Start: 2024-08-01 | End: 2024-08-02

## 2024-08-01 RX ORDER — HYDROMORPHONE HYDROCHLORIDE 1 MG/ML
0.5 INJECTION, SOLUTION INTRAMUSCULAR; INTRAVENOUS; SUBCUTANEOUS
Status: COMPLETED | OUTPATIENT
Start: 2024-08-01 | End: 2024-08-02

## 2024-08-01 RX ORDER — SODIUM CHLORIDE 9 MG/ML
INJECTION, SOLUTION INTRAVENOUS CONTINUOUS
Status: DISCONTINUED | OUTPATIENT
Start: 2024-08-01 | End: 2024-08-02

## 2024-08-01 RX ORDER — NALOXONE HYDROCHLORIDE 0.4 MG/ML
0.4 INJECTION, SOLUTION INTRAMUSCULAR; INTRAVENOUS; SUBCUTANEOUS
Status: DISCONTINUED | OUTPATIENT
Start: 2024-08-01 | End: 2024-08-07 | Stop reason: HOSPADM

## 2024-08-01 RX ORDER — FOLIC ACID 1 MG/1
1 TABLET ORAL DAILY
Status: DISCONTINUED | OUTPATIENT
Start: 2024-08-02 | End: 2024-08-07 | Stop reason: HOSPADM

## 2024-08-01 RX ORDER — HYDROXYZINE HYDROCHLORIDE 25 MG/1
25 TABLET, FILM COATED ORAL EVERY 6 HOURS PRN
Status: DISCONTINUED | OUTPATIENT
Start: 2024-08-01 | End: 2024-08-07 | Stop reason: HOSPADM

## 2024-08-01 RX ORDER — HYDROXYZINE HYDROCHLORIDE 50 MG/1
50 TABLET, FILM COATED ORAL EVERY 6 HOURS PRN
Status: DISCONTINUED | OUTPATIENT
Start: 2024-08-01 | End: 2024-08-07 | Stop reason: HOSPADM

## 2024-08-01 RX ORDER — METRONIDAZOLE 500 MG/100ML
500 INJECTION, SOLUTION INTRAVENOUS EVERY 8 HOURS
Status: DISCONTINUED | OUTPATIENT
Start: 2024-08-01 | End: 2024-08-01

## 2024-08-01 RX ORDER — METRONIDAZOLE 500 MG/100ML
500 INJECTION, SOLUTION INTRAVENOUS EVERY 12 HOURS
Status: DISCONTINUED | OUTPATIENT
Start: 2024-08-01 | End: 2024-08-01

## 2024-08-01 RX ADMIN — PIPERACILLIN AND TAZOBACTAM 4.5 G: 4; .5 INJECTION, POWDER, LYOPHILIZED, FOR SOLUTION INTRAVENOUS at 23:46

## 2024-08-01 RX ADMIN — METRONIDAZOLE 500 MG: 500 INJECTION, SOLUTION INTRAVENOUS at 14:33

## 2024-08-01 RX ADMIN — OXYCODONE HYDROCHLORIDE 5 MG: 5 TABLET ORAL at 17:04

## 2024-08-01 RX ADMIN — ACETAMINOPHEN 975 MG: 325 TABLET, FILM COATED ORAL at 06:34

## 2024-08-01 RX ADMIN — KETOROLAC TROMETHAMINE 15 MG: 15 INJECTION, SOLUTION INTRAMUSCULAR; INTRAVENOUS at 21:55

## 2024-08-01 RX ADMIN — KETOROLAC TROMETHAMINE 15 MG: 15 INJECTION, SOLUTION INTRAMUSCULAR; INTRAVENOUS at 16:20

## 2024-08-01 RX ADMIN — CEFTRIAXONE SODIUM 2 G: 2 INJECTION, POWDER, FOR SOLUTION INTRAMUSCULAR; INTRAVENOUS at 13:03

## 2024-08-01 RX ADMIN — ACETAMINOPHEN 975 MG: 325 TABLET, FILM COATED ORAL at 22:02

## 2024-08-01 RX ADMIN — OXYCODONE HYDROCHLORIDE 5 MG: 5 TABLET ORAL at 12:02

## 2024-08-01 RX ADMIN — SODIUM CHLORIDE: 9 INJECTION, SOLUTION INTRAVENOUS at 04:21

## 2024-08-01 RX ADMIN — ACETAMINOPHEN 975 MG: 325 TABLET, FILM COATED ORAL at 14:33

## 2024-08-01 RX ADMIN — KETOROLAC TROMETHAMINE 15 MG: 15 INJECTION, SOLUTION INTRAMUSCULAR; INTRAVENOUS at 10:19

## 2024-08-01 RX ADMIN — OXYCODONE HYDROCHLORIDE 5 MG: 5 TABLET ORAL at 23:55

## 2024-08-01 RX ADMIN — SODIUM CHLORIDE: 9 INJECTION, SOLUTION INTRAVENOUS at 12:01

## 2024-08-01 RX ADMIN — AZITHROMYCIN MONOHYDRATE 500 MG: 500 INJECTION, POWDER, LYOPHILIZED, FOR SOLUTION INTRAVENOUS at 16:20

## 2024-08-01 RX ADMIN — KETOROLAC TROMETHAMINE 15 MG: 15 INJECTION, SOLUTION INTRAMUSCULAR; INTRAVENOUS at 04:28

## 2024-08-01 RX ADMIN — HYDROXYZINE HYDROCHLORIDE 25 MG: 25 TABLET ORAL at 23:55

## 2024-08-01 ASSESSMENT — ACTIVITIES OF DAILY LIVING (ADL)
ADLS_ACUITY_SCORE: 18

## 2024-08-01 NOTE — PROGRESS NOTES
Sepsis Evaluation     I was called to see Ramón Cruz due to abnormal vital signs triggering the Sepsis SIRS screening alert. He is known to have an infection.     No vision changes. No headaches. No chest pain. No new SOB (though baseline pain with breathing d/t R flank pain). No new leg swelling. No palpitations. Feels a little warm and sweaty.     PHYSICAL EXAM  Vital Signs:  Temp: (!) 101.5  F (38.6  C) Temp src: Oral BP: 128/77 Pulse: 111   Resp: 22 SpO2: 92 % O2 Device: Nasal cannula Oxygen Delivery: 2 LPM    General: No acute distress, unchanged from evaluation earlier this am. Alert and oriented. Engages in conversation appropriately. Appears mildly uncomfortable due to R flank pain.   Mental Status: baseline mental status.   Cardiovascular: S1S2. RRR. No murmurs, rubs, or gallops. No bl LE edema  Pulmonary: CTAB, decreased chest recoil with respiration secondary to RU chest wall/flank pain     DATA  Lactic Acid, Initial   Date Value Ref Range Status   07/31/2024 0.9 0.7 - 2.0 mmol/L Final       ASSESSMENT AND PLAN  Ramón Cruz meets SIRS criteria with temperature of 101.5, , and WBC 15 this am. Infection source: undifferentiated pneumonia and possible pyelonephritis. Currently being treated with Zithromax 500mg IV + Flagyl 500mg IV + Rocephin 2g IV. Lactic acid 0.9 7/31. Ordered STAT lactic acid, CBC w/ diff, blood culture, and CMP. Average HR in high 80s this am, currently 115+. These vital signs, lab and physical exam findings constitute a diagnosis of SEPSIS. Consider escalating Abx to Vanc + Zosyn pending LA results.       Anti-infectives (From now, onward)      Start     Dose/Rate Route Frequency Ordered Stop    08/01/24 1600  azithromycin (ZITHROMAX) 500 mg in  mL intermittent infusion         500 mg  over 1 Hours Intravenous EVERY 24 HOURS 07/31/24 1956 08/01/24 1400  metroNIDAZOLE (FLAGYL) infusion 500 mg        Note to Pharmacy: Metronidazole IV may be dosed more  frequently than Q12h for bacteremia, CNS infection and Clostridium difficile.    500 mg  over 60 Minutes Intravenous EVERY 8 HOURS 08/01/24 1310      08/01/24 1300  cefTRIAXone (ROCEPHIN) 2 g vial to attach to  ml bag for ADULTS or NS 50 ml bag for PEDS         2 g  over 30 Minutes Intravenous EVERY 24 HOURS 07/31/24 1956            Current antibiotic coverage is appropriate for source of infection.     Disposition: The patient will remain on the current unit. We will continue to monitor this patient closely..  Promise Bardales,   08/01/24, 5:09 PM    Sepsis Criteria   Sepsis: The body's generalized inflammatory state as a response to an infection. Sepsis Predictive Model includes >80 variable to alert to potential sepsis.  Severe Sepsis: Sepsis plus one or more variables of acute organ dysfunction (Note: lactic acid >2 or acute encephalopathy each qualify as organ dysfunction)  Septic Shock: Sepsis AND hypotension despite adequate volume resuscitation with crystalloid or lactic acid >=4  Note: HYPOTENSION is defined as 2 BP readings measured 3 hrs apart that have a SBP <90, MAP <65, or decrease >40 mmHg, occurring 6 hrs before or after t-zero

## 2024-08-01 NOTE — PROGRESS NOTES
8MS ADMISSION    D: Patient admitted/transferred from  ED via transport for Sepsis d/t unspecified organism/dysfunction.     I: Upon arrival to the unit patient was oriented to room, unit, and call light. Patient s height, weight, and vital signs were obtained. Allergies reviewed and allergy band applied. Provider notified of patient s arrival on the unit. Adult AVS completed. Head to toe assessment completed. Education assessment completed. Care plan initiated.    A: Vital signs stable upon admission. Patient rates pain at 9. Two RN skin check not completed. Significant Skin Findings include L thumb suture with black scabbing. [facility] Nurse Consult Ordered no . Bed Algorithm can be found in PCS flow sheets (Support Surface Algorithm) and on IP [facility] NURSE RESOURCE TAB, was this used during this assessment? yes. Was a bariatric bed frame ordered? NO. Was an air pump added to the Isoflex mattress? NO    P: Continue to monitor patient s pain and oxygen levels and intervene as needed. Continue with plan of care. Notify provider with any concerns or changes in patient status.

## 2024-08-01 NOTE — PLAN OF CARE
Goal Outcome Evaluation:  Alert and oriented X 4.  Khmer speaking,  needed. Able to make needs known. Call light in reach. VSS. Afebrile. Maintaining O2 sats in upper 90s on 3 LPM via NC. Back pain managed with Toradol and Tylenol.  Right PIV patent, IV fluids infusing as ordered. Up ad nay. Able to reposition self. Using the urinal at bedside. No BM this shift. Appears to be sleeping during rounds. Airborne isolation precautions maintained. Continue to monitor.

## 2024-08-01 NOTE — PLAN OF CARE
"Goal Outcome Evaluation:      Plan of Care Reviewed With: patient    Overall Patient Progress: no changeOverall Patient Progress: no change    Outcome Evaluation: Patient still having pain, SOB. On 4LPM NC to maintain O2 >92%.    Problem: Adult Inpatient Plan of Care  Goal: Plan of Care Review  Description: The Plan of Care Review/Shift note should be completed every shift.  The Outcome Evaluation is a brief statement about your assessment that the patient is improving, declining, or no change.  This information will be displayed automatically on your shift  note.  Outcome: Progressing  Flowsheets (Taken 8/1/2024 0020)  Outcome Evaluation: Patient still having pain, SOB. On 4LPM NC to maintain O2 >92%.  Plan of Care Reviewed With: patient  Overall Patient Progress: no change  Goal: Patient-Specific Goal (Individualized)  Description: You can add care plan individualizations to a care plan. Examples of Individualization might be:  \"Parent requests to be called daily at 9am for status\", \"I have a hard time hearing out of my right ear\", or \"Do not touch me to wake me up as it startles  me\".  Outcome: Progressing  Goal: Absence of Hospital-Acquired Illness or Injury  Outcome: Progressing  Intervention: Identify and Manage Fall Risk  Recent Flowsheet Documentation  Taken 7/31/2024 2200 by Eileen Blackburn RN  Safety Promotion/Fall Prevention:   clutter free environment maintained   lighting adjusted   nonskid shoes/slippers when out of bed   patient and family education   room organization consistent  Intervention: Prevent Skin Injury  Recent Flowsheet Documentation  Taken 7/31/2024 2200 by Eileen Blackburn RN  Body Position: position changed independently  Skin Protection: adhesive use limited  Device Skin Pressure Protection: adhesive use limited  Intervention: Prevent Infection  Recent Flowsheet Documentation  Taken 7/31/2024 2200 by Eileen Blackburn RN  Infection Prevention:   equipment surfaces " disinfected   hand hygiene promoted   personal protective equipment utilized   single patient room provided   visitors restricted/screened  Goal: Optimal Comfort and Wellbeing  Outcome: Progressing  Goal: Readiness for Transition of Care  Outcome: Progressing  Intervention: Mutually Develop Transition Plan  Recent Flowsheet Documentation  Taken 7/31/2024 2000 by Eileen Blackburn, RN  Equipment Currently Used at Home: none     Problem: Pain Acute  Goal: Optimal Pain Control and Function  Outcome: Progressing  Intervention: Prevent or Manage Pain  Recent Flowsheet Documentation  Taken 7/31/2024 2200 by Eileen Blackburn, RN  Medication Review/Management:   medications reviewed   infusion initiated

## 2024-08-02 ENCOUNTER — APPOINTMENT (OUTPATIENT)
Dept: INTERPRETER SERVICES | Facility: CLINIC | Age: 39
End: 2024-08-02
Payer: MEDICAID

## 2024-08-02 ENCOUNTER — VIRTUAL VISIT (OUTPATIENT)
Dept: INTERPRETER SERVICES | Facility: CLINIC | Age: 39
End: 2024-08-02
Payer: MEDICAID

## 2024-08-02 LAB
ALBUMIN SERPL BCG-MCNC: 2.9 G/DL (ref 3.5–5.2)
ALP SERPL-CCNC: 75 U/L (ref 40–150)
ALT SERPL W P-5'-P-CCNC: 14 U/L (ref 0–70)
ANION GAP SERPL CALCULATED.3IONS-SCNC: 10 MMOL/L (ref 7–15)
AST SERPL W P-5'-P-CCNC: 9 U/L (ref 0–45)
ATRIAL RATE - MUSE: 105 BPM
BASE EXCESS BLDV CALC-SCNC: -1 MMOL/L (ref -3–3)
BILIRUB SERPL-MCNC: 1.3 MG/DL
BUN SERPL-MCNC: 8.8 MG/DL (ref 6–20)
C PNEUM DNA SPEC QL NAA+PROBE: NOT DETECTED
CALCIUM SERPL-MCNC: 8 MG/DL (ref 8.8–10.4)
CHLORIDE SERPL-SCNC: 100 MMOL/L (ref 98–107)
CREAT SERPL-MCNC: 0.9 MG/DL (ref 0.67–1.17)
DIASTOLIC BLOOD PRESSURE - MUSE: NORMAL MMHG
EGFRCR SERPLBLD CKD-EPI 2021: >90 ML/MIN/1.73M2
ERYTHROCYTE [DISTWIDTH] IN BLOOD BY AUTOMATED COUNT: 13.8 % (ref 10–15)
FLUAV H1 2009 PAND RNA SPEC QL NAA+PROBE: NOT DETECTED
FLUAV H1 RNA SPEC QL NAA+PROBE: NOT DETECTED
FLUAV H3 RNA SPEC QL NAA+PROBE: NOT DETECTED
FLUAV RNA SPEC QL NAA+PROBE: NOT DETECTED
FLUBV RNA SPEC QL NAA+PROBE: NOT DETECTED
GAMMA INTERFERON BACKGROUND BLD IA-ACNC: 0.04 IU/ML
GLUCOSE SERPL-MCNC: 102 MG/DL (ref 70–99)
HADV DNA SPEC QL NAA+PROBE: NOT DETECTED
HCO3 BLDV-SCNC: 24 MMOL/L (ref 21–28)
HCO3 SERPL-SCNC: 23 MMOL/L (ref 22–29)
HCOV PNL SPEC NAA+PROBE: NOT DETECTED
HCT VFR BLD AUTO: 38.8 % (ref 40–53)
HGB BLD-MCNC: 12.9 G/DL (ref 13.3–17.7)
HMPV RNA SPEC QL NAA+PROBE: NOT DETECTED
HPIV1 RNA SPEC QL NAA+PROBE: NOT DETECTED
HPIV2 RNA SPEC QL NAA+PROBE: NOT DETECTED
HPIV3 RNA SPEC QL NAA+PROBE: NOT DETECTED
HPIV4 RNA SPEC QL NAA+PROBE: NOT DETECTED
INTERPRETATION ECG - MUSE: NORMAL
M PNEUMO DNA SPEC QL NAA+PROBE: NOT DETECTED
M TB IFN-G BLD-IMP: NEGATIVE
M TB IFN-G CD4+ BCKGRND COR BLD-ACNC: 9.96 IU/ML
MCH RBC QN AUTO: 30.1 PG (ref 26.5–33)
MCHC RBC AUTO-ENTMCNC: 33.2 G/DL (ref 31.5–36.5)
MCV RBC AUTO: 91 FL (ref 78–100)
MITOGEN IGNF BCKGRD COR BLD-ACNC: -0.01 IU/ML
MITOGEN IGNF BCKGRD COR BLD-ACNC: -0.03 IU/ML
MRSA DNA SPEC QL NAA+PROBE: NEGATIVE
NT-PROBNP SERPL-MCNC: 109 PG/ML (ref 0–450)
O2/TOTAL GAS SETTING VFR VENT: 25 %
OXYHGB MFR BLDV: 75 % (ref 70–75)
P AXIS - MUSE: 55 DEGREES
PCO2 BLDV: 42 MM HG (ref 40–50)
PH BLDV: 7.37 [PH] (ref 7.32–7.43)
PLATELET # BLD AUTO: 351 10E3/UL (ref 150–450)
PO2 BLDV: 37 MM HG (ref 25–47)
POTASSIUM SERPL-SCNC: 3.7 MMOL/L (ref 3.4–5.3)
PR INTERVAL - MUSE: 128 MS
PROCALCITONIN SERPL IA-MCNC: 1.44 NG/ML
PROT SERPL-MCNC: 6.5 G/DL (ref 6.4–8.3)
QRS DURATION - MUSE: 82 MS
QT - MUSE: 308 MS
QTC - MUSE: 407 MS
QUANTIFERON MITOGEN: 10 IU/ML
QUANTIFERON NIL TUBE: 0.04 IU/ML
QUANTIFERON TB1 TUBE: 0.01 IU/ML
QUANTIFERON TB2 TUBE: 0.03
R AXIS - MUSE: 116 DEGREES
RBC # BLD AUTO: 4.28 10E6/UL (ref 4.4–5.9)
RSV RNA SPEC QL NAA+PROBE: NOT DETECTED
RSV RNA SPEC QL NAA+PROBE: NOT DETECTED
RV+EV RNA SPEC QL NAA+PROBE: NOT DETECTED
SA TARGET DNA: NEGATIVE
SAO2 % BLDV: 76 % (ref 70–75)
SODIUM SERPL-SCNC: 133 MMOL/L (ref 135–145)
SYSTOLIC BLOOD PRESSURE - MUSE: NORMAL MMHG
T AXIS - MUSE: -10 DEGREES
VANCOMYCIN SERPL-MCNC: <4 UG/ML
VENTRICULAR RATE- MUSE: 105 BPM
WBC # BLD AUTO: 18.2 10E3/UL (ref 4–11)

## 2024-08-02 PROCEDURE — 94640 AIRWAY INHALATION TREATMENT: CPT

## 2024-08-02 PROCEDURE — 36415 COLL VENOUS BLD VENIPUNCTURE: CPT | Performed by: FAMILY MEDICINE

## 2024-08-02 PROCEDURE — 250N000011 HC RX IP 250 OP 636

## 2024-08-02 PROCEDURE — T1013 SIGN LANG/ORAL INTERPRETER: HCPCS | Mod: U4,TEL

## 2024-08-02 PROCEDURE — 87449 NOS EACH ORGANISM AG IA: CPT | Performed by: INTERNAL MEDICINE

## 2024-08-02 PROCEDURE — 87633 RESP VIRUS 12-25 TARGETS: CPT

## 2024-08-02 PROCEDURE — 87449 NOS EACH ORGANISM AG IA: CPT

## 2024-08-02 PROCEDURE — 250N000013 HC RX MED GY IP 250 OP 250 PS 637

## 2024-08-02 PROCEDURE — 83880 ASSAY OF NATRIURETIC PEPTIDE: CPT

## 2024-08-02 PROCEDURE — 80053 COMPREHEN METABOLIC PANEL: CPT

## 2024-08-02 PROCEDURE — 250N000011 HC RX IP 250 OP 636: Performed by: FAMILY MEDICINE

## 2024-08-02 PROCEDURE — 250N000009 HC RX 250

## 2024-08-02 PROCEDURE — 99223 1ST HOSP IP/OBS HIGH 75: CPT | Mod: GC | Performed by: INTERNAL MEDICINE

## 2024-08-02 PROCEDURE — 999N000157 HC STATISTIC RCP TIME EA 10 MIN

## 2024-08-02 PROCEDURE — 87385 HISTOPLASMA CAPSUL AG IA: CPT | Performed by: INTERNAL MEDICINE

## 2024-08-02 PROCEDURE — 36415 COLL VENOUS BLD VENIPUNCTURE: CPT

## 2024-08-02 PROCEDURE — 258N000003 HC RX IP 258 OP 636

## 2024-08-02 PROCEDURE — 120N000002 HC R&B MED SURG/OB UMMC

## 2024-08-02 PROCEDURE — 87205 SMEAR GRAM STAIN: CPT

## 2024-08-02 PROCEDURE — 85027 COMPLETE CBC AUTOMATED: CPT

## 2024-08-02 PROCEDURE — 84145 PROCALCITONIN (PCT): CPT | Performed by: INTERNAL MEDICINE

## 2024-08-02 PROCEDURE — 258N000003 HC RX IP 258 OP 636: Performed by: FAMILY MEDICINE

## 2024-08-02 PROCEDURE — 80202 ASSAY OF VANCOMYCIN: CPT | Performed by: FAMILY MEDICINE

## 2024-08-02 PROCEDURE — 99255 IP/OBS CONSLTJ NEW/EST HI 80: CPT | Performed by: INTERNAL MEDICINE

## 2024-08-02 PROCEDURE — 86606 ASPERGILLUS ANTIBODY: CPT

## 2024-08-02 PROCEDURE — 94640 AIRWAY INHALATION TREATMENT: CPT | Mod: 76

## 2024-08-02 PROCEDURE — 82805 BLOOD GASES W/O2 SATURATION: CPT

## 2024-08-02 PROCEDURE — 99233 SBSQ HOSP IP/OBS HIGH 50: CPT | Mod: GC

## 2024-08-02 PROCEDURE — 87641 MR-STAPH DNA AMP PROBE: CPT

## 2024-08-02 RX ORDER — IPRATROPIUM BROMIDE AND ALBUTEROL SULFATE 2.5; .5 MG/3ML; MG/3ML
3 SOLUTION RESPIRATORY (INHALATION)
Status: DISCONTINUED | OUTPATIENT
Start: 2024-08-02 | End: 2024-08-03

## 2024-08-02 RX ORDER — LIDOCAINE 4 G/G
PATCH TOPICAL
Status: CANCELLED | OUTPATIENT
Start: 2024-08-02

## 2024-08-02 RX ORDER — SODIUM CHLORIDE, SODIUM LACTATE, POTASSIUM CHLORIDE, CALCIUM CHLORIDE 600; 310; 30; 20 MG/100ML; MG/100ML; MG/100ML; MG/100ML
INJECTION, SOLUTION INTRAVENOUS CONTINUOUS
Status: DISCONTINUED | OUTPATIENT
Start: 2024-08-02 | End: 2024-08-04

## 2024-08-02 RX ORDER — LIDOCAINE 4 G/G
1 PATCH TOPICAL
Status: DISCONTINUED | OUTPATIENT
Start: 2024-08-02 | End: 2024-08-07 | Stop reason: HOSPADM

## 2024-08-02 RX ORDER — KETOROLAC TROMETHAMINE 15 MG/ML
15 INJECTION, SOLUTION INTRAMUSCULAR; INTRAVENOUS EVERY 6 HOURS
Status: DISCONTINUED | OUTPATIENT
Start: 2024-08-02 | End: 2024-08-05

## 2024-08-02 RX ORDER — HYDROMORPHONE HCL IN WATER/PF 6 MG/30 ML
0.2 PATIENT CONTROLLED ANALGESIA SYRINGE INTRAVENOUS
Status: DISCONTINUED | OUTPATIENT
Start: 2024-08-02 | End: 2024-08-07 | Stop reason: HOSPADM

## 2024-08-02 RX ORDER — HYDROMORPHONE HYDROCHLORIDE 1 MG/ML
0.5 INJECTION, SOLUTION INTRAMUSCULAR; INTRAVENOUS; SUBCUTANEOUS
Status: COMPLETED | OUTPATIENT
Start: 2024-08-02 | End: 2024-08-02

## 2024-08-02 RX ORDER — POLYETHYLENE GLYCOL 3350 17 G/17G
17 POWDER, FOR SOLUTION ORAL 2 TIMES DAILY PRN
Status: DISCONTINUED | OUTPATIENT
Start: 2024-08-02 | End: 2024-08-07 | Stop reason: HOSPADM

## 2024-08-02 RX ORDER — LIDOCAINE 4 G/G
1 PATCH TOPICAL
Status: CANCELLED | OUTPATIENT
Start: 2024-08-02

## 2024-08-02 RX ADMIN — IPRATROPIUM BROMIDE AND ALBUTEROL SULFATE 3 ML: .5; 3 SOLUTION RESPIRATORY (INHALATION) at 21:52

## 2024-08-02 RX ADMIN — ACETAMINOPHEN 975 MG: 325 TABLET, FILM COATED ORAL at 05:47

## 2024-08-02 RX ADMIN — VANCOMYCIN HYDROCHLORIDE 1500 MG: 10 INJECTION, POWDER, LYOPHILIZED, FOR SOLUTION INTRAVENOUS at 11:34

## 2024-08-02 RX ADMIN — SODIUM CHLORIDE, POTASSIUM CHLORIDE, SODIUM LACTATE AND CALCIUM CHLORIDE: 600; 310; 30; 20 INJECTION, SOLUTION INTRAVENOUS at 10:52

## 2024-08-02 RX ADMIN — OXYCODONE HYDROCHLORIDE 5 MG: 5 TABLET ORAL at 14:32

## 2024-08-02 RX ADMIN — KETOROLAC TROMETHAMINE 15 MG: 15 INJECTION, SOLUTION INTRAMUSCULAR; INTRAVENOUS at 18:38

## 2024-08-02 RX ADMIN — SODIUM CHLORIDE, POTASSIUM CHLORIDE, SODIUM LACTATE AND CALCIUM CHLORIDE 1000 ML: 600; 310; 30; 20 INJECTION, SOLUTION INTRAVENOUS at 15:32

## 2024-08-02 RX ADMIN — PIPERACILLIN AND TAZOBACTAM 4.5 G: 4; .5 INJECTION, POWDER, LYOPHILIZED, FOR SOLUTION INTRAVENOUS at 10:11

## 2024-08-02 RX ADMIN — OXYCODONE HYDROCHLORIDE 5 MG: 5 TABLET ORAL at 21:21

## 2024-08-02 RX ADMIN — VANCOMYCIN HYDROCHLORIDE 1500 MG: 10 INJECTION, POWDER, LYOPHILIZED, FOR SOLUTION INTRAVENOUS at 00:50

## 2024-08-02 RX ADMIN — PIPERACILLIN AND TAZOBACTAM 4.5 G: 4; .5 INJECTION, POWDER, LYOPHILIZED, FOR SOLUTION INTRAVENOUS at 17:51

## 2024-08-02 RX ADMIN — HYDROMORPHONE HYDROCHLORIDE 0.5 MG: 1 INJECTION, SOLUTION INTRAMUSCULAR; INTRAVENOUS; SUBCUTANEOUS at 05:35

## 2024-08-02 RX ADMIN — IPRATROPIUM BROMIDE AND ALBUTEROL SULFATE 3 ML: .5; 3 SOLUTION RESPIRATORY (INHALATION) at 16:30

## 2024-08-02 RX ADMIN — FOLIC ACID 1 MG: 1 TABLET ORAL at 08:18

## 2024-08-02 RX ADMIN — PIPERACILLIN AND TAZOBACTAM 4.5 G: 4; .5 INJECTION, POWDER, LYOPHILIZED, FOR SOLUTION INTRAVENOUS at 05:41

## 2024-08-02 RX ADMIN — ACETAMINOPHEN 975 MG: 325 TABLET, FILM COATED ORAL at 21:21

## 2024-08-02 RX ADMIN — PIPERACILLIN AND TAZOBACTAM 4.5 G: 4; .5 INJECTION, POWDER, LYOPHILIZED, FOR SOLUTION INTRAVENOUS at 23:59

## 2024-08-02 RX ADMIN — THIAMINE HCL TAB 100 MG 100 MG: 100 TAB at 08:18

## 2024-08-02 RX ADMIN — GUAIFENESIN AND DEXTROMETHORPHAN 10 ML: 100; 10 SYRUP ORAL at 22:46

## 2024-08-02 RX ADMIN — KETOROLAC TROMETHAMINE 15 MG: 15 INJECTION, SOLUTION INTRAMUSCULAR; INTRAVENOUS at 05:39

## 2024-08-02 RX ADMIN — IPRATROPIUM BROMIDE AND ALBUTEROL SULFATE 3 ML: .5; 3 SOLUTION RESPIRATORY (INHALATION) at 13:04

## 2024-08-02 RX ADMIN — OXYCODONE HYDROCHLORIDE 5 MG: 5 TABLET ORAL at 08:18

## 2024-08-02 RX ADMIN — KETOROLAC TROMETHAMINE 15 MG: 15 INJECTION, SOLUTION INTRAMUSCULAR; INTRAVENOUS at 12:16

## 2024-08-02 RX ADMIN — LIDOCAINE 1 PATCH: 4 PATCH TOPICAL at 05:42

## 2024-08-02 RX ADMIN — ALBUTEROL SULFATE 2.5 MG: 2.5 SOLUTION RESPIRATORY (INHALATION) at 02:49

## 2024-08-02 RX ADMIN — ACETAMINOPHEN 975 MG: 325 TABLET, FILM COATED ORAL at 14:32

## 2024-08-02 RX ADMIN — HYDROMORPHONE HYDROCHLORIDE 0.5 MG: 1 INJECTION, SOLUTION INTRAMUSCULAR; INTRAVENOUS; SUBCUTANEOUS at 02:49

## 2024-08-02 RX ADMIN — HYDROXYZINE HYDROCHLORIDE 50 MG: 50 TABLET, FILM COATED ORAL at 22:37

## 2024-08-02 ASSESSMENT — ACTIVITIES OF DAILY LIVING (ADL)
ADLS_ACUITY_SCORE: 18

## 2024-08-02 NOTE — PROGRESS NOTES
"7AM-11AM    Neuro/Orientation: A&Ox4, able to make needs known, Polish speaking and needs . Pt denied N/T when asked.  Respiratory: Pt is intermittently desating between 80-96%- at some point pt dropped down to the high 50s when O2 was removed MD was in the room and observed this, on 4L of O2 via NC. Dyspnea on exertion and with activities, right lung sounds shallow and diminished. Pt is on continuous pulse ox.   Activity/Transfer: Independent in room  Diet: Reg/thin-pills whole, good appetite, needs help ordering meals.  GI/: continent X2-LBM 7/30 per pt report, urinal at bedside  Skin: Intact except for necrotic left thumb  Pain: c/o pain rating it a 7/10, PRN oxy given with some relief. Pt is also getting schedule toradol and tylenol for pain.   LDA: right PIV-infusing LR at 125  Plan: IV abx, pulmonary and infectious disease consults, no other concerns as of now, continue with POC.   Additional Notes: MRSA and Respiratory swabs send to lab    Patient most recent vitals:  BP (!) 136/93 (BP Location: Left arm, Patient Position: Semi-Rausch's, Cuff Size: Adult Regular)   Pulse 90   Temp 98.1  F (36.7  C) (Oral)   Resp 20   Ht 1.65 m (5' 4.96\")   Wt 93.3 kg (205 lb 11 oz)   SpO2 96%   BMI 34.27 kg/m          "

## 2024-08-02 NOTE — PROGRESS NOTES
[facility]    Family Medicine Progress Note - [facility]       Main Plans for Today   - Consult Pulmonology   - Consult Infectious Disease  - Continue broad spectrum abx  - Pain control, incentive spirometry  - CTM closely    Assessment & Plan   Ramón Cruz is a 39 year old male w/ no significant pmhx admitted on 7/31/2024 with 2 days of dyspnea, pleuritic chest pain, fever, and cough. Found to have multifocal PNA with loculated pleural effusion.     Multifocal complicated pneumonia  AHRF 2/2 PNA  Sepsis (fever, WBC, HR) non-severe, due to unknown organism  Multiloculated R pleural effusion  Pleuritic chest pain  Low lung volumes  Elevated CRP (8/1 = 454)  Admitted hypoxia requiring 2-5L O2, CXR w multifocal opacities throughout R lung and small R loculated pleural effusion consistent with complicated pneumonia. Extensive travel hx. EtoH use, increasing risk of aspiration PNA. Placed on azithromycin and CTX, added metronidazole for anaerobic coverage given EtOH use history. Then overnight 8/1 met sepsis criteria and developed worsening hypoxia, leukocytosis, fever, and increased R pleuritic pain. Repeat CXR identified enlarging pleural effusion and worsening opacities. Broadened coverage to zosyn and vancomycin and consulted ID for add'l abx recommendations given worsening clinical picture despite broad coverage. Consulted Pulmonology for opinion on need for chest tube for enlarging pleural effusion. Considered other etiologies for sepsis but ROS and exam unrevealing. Euvolemic-hypovolemic on exam and no known hxo HF, so do not suspect cardiogenic. Patient continues to be febrile, dyspneic, and hypoxic requiring 3-5L O2.     Micro  - COVID, HIV, influenza, RSV negative    - Resp panel PCR negative  - MRSA nares negative  - Resp aerobic culture w gram stain, expectorate, normal oral nell  - Legionella neg  - HIV neg  - Quant TB gold neg  - Ucx 7/31 NGTD  -  Bcx 7/31 NGTD  - 1,3 B D glucan in process    Abx   - Vancomycin q12h Pharmacy to Dose (8/1 - P)  - Zosyn 4.5g q6h (8/1 - P)  - SP Ceftriaxone 2g IV + Zithromax 500mg IV (7/31 - 8/1)  - SP Metronidazole 500mg  IV (8/1)    Management  - LR @ 125/hr  - Pain  - Tylenol 975mg q8h schd  - Toradol 15mg IV q6h schd x5d  - Dilaudid 0.2 mg q2h PRN  - Oxycodone 2.5 - 5 mg q4h prn  - Lidocaine patch  - Hydroxyzine 25-50mg q6h PRN  - Robitussin 10ml q4h PRN  - Duo neb q4h RT while awake  - Incentive spirometry  - Supplemental O2     Consult  - ID  - Pulmonology    Monitoring  - VS q4h  - Continuous pulse ox  - Daily CBC    Opioid induced constipation prevention  Encouraged pt to use bowel PRNs  - Senna PRN  - Mirilax BID PRN    Cortical wedge shaped hypo attenuation of kidneys  CT abdomen reveals cortical wedge-shaped linear hypoattenuating regions in the mid to lower poles of kidneys bilaterally concerning for pyelonephritis. However, no urinary symptoms and UA negative for infection.   - Will discuss with Radiology    Moderate hyponatremia  Suspect due to acute illness  - Treatment as above     Hyperbilirubinemia  H/o elevated LFTs  During April visit to ED,  and AST 58, normal bili. This admission, bilirubin is 2.0, other LFTs unremarkable. Cholecystectomy ~3 years ago.      Alcohol use  Per patient report drinks 24 beers in 1 day every 2 weeks.  - Thiamine 100mg daily  - Folic acid 1mg daily    L thumb wound, suspicious for necrosis  Trauma to the thumb ~1 month ago. Black, necrotic tissue at the tip of the thumb. However, no erythema, tenderness, or discharge and so do not suspect this as source of infection.  - No management necessary as of now.     H/o cocaine use  Documentation from April 2024 noting screen positives for cocaine w/ associated confusion. No current use.     Diet: Combination Diet Regular Diet Adult  Fluids: LR 125cc/hr  DVT Prophylaxis: Pneumatic Compression Devices  Code Status: Full  Code    Disposition Plan   Expected discharge: 2 - 3 days, recommended to prior living arrangement once adequate pain management/ tolerating PO medications and antibiotic plan established.      Entered: Karon Dove MD 08/02/2024, 2:47 PM    The patient's care was discussed with the Attending Physician, Dr. Martinez, Bedside Nurse, and Patient.    Karon Dove MD  [facility]  Pager: [phone]  Please see sticky note for cross cover information    Interval History   Overnight, clinically worsened, see brief progress note    This morning, reports ongoing dyspnea, right back pain, low appetite, diaphoresis, and chills  Denies abdominal pain aside from 'soreness' from coughing, sore throat, rhinorrhea, orthopnea, diarrhea, pain w urination, dizziness, headache    Physical Exam   Vital Signs: Temp: (!) 101  F (38.3  C) Temp src: Oral BP: (!) 134/92 Pulse: 97   Resp: 20 SpO2: 97 % O2 Device: Nasal cannula Oxygen Delivery: 4 LPM  Weight: 205 lbs 11.03 oz  General Appearance: Diaphoretic, uncomfortable. Less dyspneic when supine  HEENT: no oral mucosal lesions or conjunctivitis. No rhinorrhea.   Skin: no rash on face, neck, chest, back, or abdomen  Lymph: no cervical adenopathy  Cardiovascular: RRR.  No lower extremity edema. Normal capillary refill  Respiratory: Breathing shallow and tachypneic. Able to speak in full sentences. Decreased lung sounds, egophany present on R. Scattered rhonchi and rales throughout R side. L side relatively clear. Very tender to percussion.   GI: Soft nondistended non-tender, normal BS  Skin: Necrotic tip of left thumb-chronic, stable. No erythema or drainage       Data   Recent Labs   Lab 08/02/24  0524 08/02/24  0522 08/01/24  2217 08/01/24  1727   WBC 18.2*  --  18.2* 14.5*   HGB 12.9*  --  13.3 12.1*   MCV 91  --  91 92     --  360 332   NA  --  133* 133* 132*   POTASSIUM  --  3.7 3.8 3.3*   CHLORIDE  --  100 101 101   CO2  --  23 19* 19*   BUN  --   8.8 10.0 10.0   CR  --  0.90 0.83 0.89   ANIONGAP  --  10 13 12   TALA  --  8.0* 8.2* 8.3*   GLC  --  102* 118* 163*   ALBUMIN  --  2.9* 2.9* 3.0*   PROTTOTAL  --  6.5 6.8 6.6   BILITOTAL  --  1.3* 0.9 0.9   ALKPHOS  --  75 95 66   ALT  --  14 17 18   AST  --  9 8 8       Imaging results reviewed over the past 24 hrs:   Recent Results (from the past 24 hour(s))   XR Chest 2 Views    Narrative    Exam: XR CHEST 2 VIEWS, 8/1/2024 11:01 PM    Indication: increased SOB and oxygen requirements, known multifocal  PNA and small right pleural effusion, currently on antibiotics    Comparison: Chest radiograph and chest CT 7/31/2024    Findings:   PA and lateral upright views of the chest were obtained. Continued low  lung volumes with stable cardiomediastinal silhouette. No  pneumothorax. Increased size of the small loculated right pleural  effusion. Increased mixed opacities throughout the right lung fields  with continued streaky left basilar opacities.      Impression    Impression:   1. Increased size of the small to moderate loculated right pleural  effusion.  2. Increased opacities throughout the right lung fields with continued  streaky left basilar opacities, suspicious for infection.  3. Low lung volumes.    I have personally reviewed the examination and initial interpretation  and I agree with the findings.    TAB FAGAN MD         SYSTEM ID:  [id number]

## 2024-08-02 NOTE — PROGRESS NOTES
"[facility] MEDICINE   BRIEF PROGRESS NOTE    SUBJECTIVE  Called RN to request that she recheck his vitals at around 2130. She reported that patient informed her he was having increased SOB at around [phone]. She increased his oxygen to 2-4 L. After she took his vitals at 2132, she called to report that Ramón was experiencing chest tightness with the sensation that \"his throat is closing up\" and worsening dyspnea with a temperature of 100.4 and HR of 100.     Upon arrival, Ramón confirmed that he was not experiencing chest pain, but rather chest tightness. His pleuritic pain was preventing him from taking deep breaths.    OBJECTIVE:  /86 (BP Location: Left arm, Patient Position: Sitting, Cuff Size: Adult Regular)   Pulse 100   Temp 100.4  F (38  C) (Oral)   Resp 20   Ht 1.65 m (5' 4.96\")   Wt 93.3 kg (205 lb 11 oz)   SpO2 90%   BMI 34.27 kg/m      Exam:   General: Alert and oriented, acutely distressed.  Skin: Warm and mildly diaphoretic.  Eyes: Extra-ocular muscles intact, pupils equal and reactive.  ENT: Speech intact, nasal passages open, no hearing impairment noted.  CV: Tachycardic, regular rhythm. No cyanosis or pallor, warm and well perfused.  Respiratory: Wheezing in bilateral upper lobes, rales in RLL, increased WOB.  Neuro: Comprehension intact. Gross and fine motor skills intact.   Psychiatric: Mood and affect appear normal.   Extremities: Warm, able to move all four extremities at will.    ASSESSMENT/PLAN:  # Multifocal PNA  # Complicated loculated pleural effusion  # Acute hypoxic respiratory failure  LA, troponin WNL; EKG revealed sinus tachycardia without ST elevations or depressions, making sepsis, MI, and arrhythmia unlikely. CBC revealed worsening leukocytosis at 18.2, which could partially be related to stress given Ramón seemed anxious. Mild eosinophilia, suggesting possible allergic or autoimmune reaction to antibiotic regimen or some other agent. Unclear- ordered hydroxyzine for " both anxiety and possible underlying allergic reaction in the setting of new onset wheezing and endorsed sensation of throat closing.Neutrophilia is present, with absolute neutrophils at 13.5, suggesting ongoing infection. CMP showed mild hyponatremia at 133, with decreasing albumin. Recommend day team assess if Ramón is eating given his ongoing dyspnea. Hyperbilirubinemia has resolved. CRP was markedly elevated at 454.91; procalcitonin was elevated at 1.31; can trend these values to determine if patient is responding to antibiotics. CXR reveals increasing size of the loculated right pleural effusion as well as increased opacities throughout the right lung field, suggesting that current abx regimen (CTX, metro, azithromycin) is not covering the pathogenic agent. Will switch to vancomycin and Zosyn. ICU AZALIA also recommended to include alcohol withdrawal on the differential and to order thiamine and folic acid tablets to prevent Wernicke's encephalopathy. MRSA nares were ordered to help guide narrowing of antibiotic regimen. DuoNebs ordered to assist with wheezing and VBG ordered for AM.     STAT CBC, CMP, LA, Troponin, EKG, CXR  Add on CRP, procalcitonin, WBC with differential  Held fluid infusion to avoid fluid overload  Discontinue CTX, azithromycin, metronidazole  Start Vanc/Zosyn  MRSA nares  Hydroxyzine 25-50 mg PRN  Thiamine and folic acid tablets  DuoNeb q4hr PRN  VBG in AM    Please see daily rounding note for full A/P.  Rita Medrano DO, MPH  10:36 PM      ADDENDUM. 8/2, 0442h  Got a call from RN saying that pt's O2 saturation dropped to 70s on 5L of O2 and was shaking. Upon arrival, pt had just come from the restroom and was settling into his bed. Was shaking and feeling cold, not using NC. Warm blanket helped with the shaking and pt reported that it was easier for him to breath after he got this albuterol nebulizer a few hours ago. Was reluctant to use the NC as the air felt too cold, per pt. Also  reported that he was able to produce some sputum. This could have contributed to pt's de-satting. Still complaining of right mid-back pain. Decreased wheezing of lungs upon auscultation and decreased WOB compared to when last saw the pt around 10pm.     - Ordered lidocaine patch and another 0.5mg IV dilaudid injection  - Sputum sample sent for culture      Lety Graves MD  2198h

## 2024-08-02 NOTE — PHARMACY-VANCOMYCIN DOSING SERVICE
"Pharmacy Vancomycin Initial Note  Date of Service 2024  Patient's  1985  39 year old, male    Indication: Aspiration Pneumonia    Current estimated CrCl = Estimated Creatinine Clearance: 119.3 mL/min (based on SCr of 0.89 mg/dL).    Creatinine for last 3 days  2024: 11:49 AM Creatinine 0.82 mg/dL  2024:  7:26 AM Creatinine 0.80 mg/dL;  5:27 PM Creatinine 0.89 mg/dL    Recent Vancomycin Level(s) for last 3 days  No results found for requested labs within last 3 days.      Vancomycin IV Administrations (past 72 hours)        No vancomycin orders with administrations in past 72 hours.                    Nephrotoxins and other renal medications (From now, onward)      Start     Dose/Rate Route Frequency Ordered Stop    24 2230  piperacillin-tazobactam (ZOSYN) 4.5 g vial to attach to  mL bag        Note to Pharmacy: For [facility], [facility] and [facility]: For Zosyn-naive patients, use the \"Zosyn initial dose + extended infusion\" order panel.    4.5 g  over 30 Minutes Intravenous EVERY 6 HOURS 24 22024 2200  ketorolac (TORADOL) injection 15 mg         15 mg Intravenous EVERY 6 HOURS 24 21324 2159            Contrast Orders - past 72 hours (72h ago, onward)      Start     Dose/Rate Route Frequency Stop    24 1430  iopamidol (ISOVUE-370) solution 132 mL         132 mL Intravenous ONCE 24 1453            [employer] Prediction of Planned Initial Vancomycin Regimen  Loading dose: N/A  Regimen: 1500 mg IV every 12 hours.  Start time: 22:22 on 2024  Exposure target: AUC24 (range)400-600 mg/L.hr   AUC24,ss: 549 mg/L.hr  Probability of AUC24 > 400: 81 %  Ctrough,ss: 16.5 mg/L  Probability of Ctrough,ss > 20: 35 %  Probability of nephrotoxicity (Lodise SARI ): 12 %        Plan:  Start vancomycin  1500 mg IV q12h.   Vancomycin monitoring method: AUC  Vancomycin therapeutic monitoring goal: 400-600 mg*h/L  Pharmacy will check vancomycin levels as appropriate in " 1-3 Days.    Serum creatinine levels will be ordered daily for the first week of therapy and at least twice weekly for subsequent weeks.      Fran Chisholm RPH

## 2024-08-02 NOTE — PLAN OF CARE
"Goal Outcome Evaluation:      Plan of Care Reviewed With: patient    Overall Patient Progress: no changeOverall Patient Progress: no change         VS: /82   Pulse 92   Temp 98.4  F (36.9  C) (Oral)   Resp 20   Ht 1.65 m (5' 4.96\")   Wt 93.3 kg (205 lb 11 oz)   SpO2 90%   BMI 34.27 kg/m       O2: On 4Lpm via NC, sats dip upon exertion, and with pain to R lower back   Output: Voids spontaneously without difficulty in BSU   Activity: Independent w/ steady gait   Skin: Visible skin intact, declined skin check   Pain: Reports pain in R lower back, managed with Dilaudid, Toradol, Tylenol, Oxy   CMS: Intact, denies numbness/tingling   Dressing: No dressings   Diet: Regular, meds whole with thin liquids   LDA: (R) PIV SL, (R) port a cath, deaccessed   Equipment: IV pole, personal belongings   Plan: Pain management, maintain O2 sats         "

## 2024-08-02 NOTE — PHARMACY-ADMISSION MEDICATION HISTORY
"Pharmacist Admission Medication History    Admission medication history is complete. The information provided in this note is only as accurate as the sources available at the time of the update.    Information Source(s): Patient and CareEverywhere/SureScripts via in-person    Pertinent Information: Patient through  claimed to be not any medications except 1 OTC medication he took once to \"cleanse his liver\".     Changes made to PTA medication list:  Added: None  Deleted: None  Changed: None    Allergies reviewed with patient and updates made in EHR: yes    Medication History Completed By: Seth Flores RPH 8/2/2024 9:12 AM    No outpatient medications have been marked as taking for the 7/31/24 encounter (Hospital Encounter).     Seth Flores PharmD, BCPS    "

## 2024-08-02 NOTE — PLAN OF CARE
"Goal Outcome Evaluation:                 Outcome Evaluation: Waiting lab results. Managing pain and IV abx    Neuro/Orientation: A&Ox4, able to make needs known, Bahamian speaking using iphone . Pt denied N/T when asked.  Respiratory: 4L of O2 via NC. Dyspnea on exertion and with activities, right lung sounds shallow and diminished. Pt is on continuous pulse ox. Reports difficulty taking deep breath d/t ongoing pain \"tight, heavy chest\" This is not new per pt and report. Pt reports pain medication is helping. Provider changed duoneb order to scheduled q4h from PRN. Pt reports overall \"feeling better\"  Activity/Transfer: Independent in room  Diet: Reg/thin-pills whole, good appetite, ordered meals for pt  GI/: continent X2-LBM 7/30 per pt report, urinal at bedside voiding adequate. Urine sample sent  Skin: Intact except for necrotic left thumb. Declined skin check under clothing, denies issues  Pain: c/o pain rating it a 8/10, Scheduled Toradol, tylenol and PRN oxycodone given  LDA: right PIV-infusing LR at 125  Plan: IV abx, pulmonary and infectious disease consults pending      Patient most recent vitals:  BP (!) 134/92 (BP Location: Left arm, Cuff Size: Adult Regular)   Pulse 97   Temp (!) 101  F (38.3  C) (Oral)   Resp 20   Ht 1.65 m (5' 4.96\")   Wt 93.3 kg (205 lb 11 oz)   SpO2 96%   BMI 34.27 kg/m      Pt elevated fever this shift, MD aware and accessed. Tylenol scheduled. IV fluid bolus given. Continue to monitor.     "

## 2024-08-02 NOTE — PHARMACY-VANCOMYCIN DOSING SERVICE
"Pharmacy Vancomycin Note  Date of Service 2024  Patient's  1985   39 year old, male    Indication: Aspiration Pneumonia  Day of Therapy: 1  Current vancomycin regimen:  1500 mg IV q12h  Current vancomycin monitoring method: AUC  Current vancomycin therapeutic monitoring goal: 400-600 mg*h/L    Insight[employer] Prediction of Current Vancomycin Regimen  Loading dose: N/A  Regimen: 1500 mg IV every 12 hours.  Start time: 12:50 on 2024  Exposure target: AUC24 (range)400-600 mg/L.hr   AUC24,ss: 534 mg/L.hr  Probability of AUC24 > 400: 88 %  Ctrough,ss: 20.3 mg/L  Probability of Ctrough,ss > 20: 52 %  Probability of nephrotoxicity (Lodise SARI ): 18 %    Current estimated CrCl = Estimated Creatinine Clearance: 117.9 mL/min (based on SCr of 0.9 mg/dL).    Creatinine for last 3 days  2024: 11:49 AM Creatinine 0.82 mg/dL  2024:  7:26 AM Creatinine 0.80 mg/dL;  5:27 PM Creatinine 0.89 mg/dL; 10:17 PM Creatinine 0.83 mg/dL  2024:  5:22 AM Creatinine 0.90 mg/dL    Recent Vancomycin Levels (past 3 days)  2024:  5:24 AM Vancomycin <4.0 ug/mL    Vancomycin IV Administrations (past 72 hours)                     vancomycin (VANCOCIN) 1,500 mg in 0.9% NaCl 250 mL intermittent infusion (mg) 1,500 mg New Bag 24 0050                    Nephrotoxins and other renal medications (From now, onward)      Start     Dose/Rate Route Frequency Ordered Stop    240  piperacillin-tazobactam (ZOSYN) 4.5 g vial to attach to  mL bag        Note to Pharmacy: For [facility], [facility] and [facility]: For Zosyn-naive patients, use the \"Zosyn initial dose + extended infusion\" order panel.    4.5 g  over 30 Minutes Intravenous EVERY 6 HOURS 24 223  vancomycin (VANCOCIN) 1,500 mg in 0.9% NaCl 250 mL intermittent infusion         1,500 mg  166.7 mL/hr over 90 Minutes Intravenous EVERY 12 HOURS 244      24 2200  ketorolac (TORADOL) injection 15 mg         15 mg Intravenous EVERY " 6 HOURS 07/31/24 2130 08/05/24 2159               Contrast Orders - past 72 hours (72h ago, onward)      Start     Dose/Rate Route Frequency Stop    07/31/24 1430  iopamidol (ISOVUE-370) solution 132 mL         132 mL Intravenous ONCE 07/31/24 1453            Interpretation of levels and current regimen:  Vancomycin level is reflective of -600    Has serum creatinine changed greater than 50% in last 72 hours: No    Urine output:  good urine output    Renal Function: Stable    Plan:  Continue Current Dose  Vancomycin monitoring method: AUC  Vancomycin therapeutic monitoring goal: 400-600 mg*h/L  Pharmacy will check vancomycin levels as appropriate in 1-3 Days.  Serum creatinine levels will be ordered daily for the first week of therapy and at least twice weekly for subsequent weeks.    Seth Flores, PharmD, BCPS

## 2024-08-02 NOTE — PROGRESS NOTES
"[facility]   BRIEF PROGRESS NOTE    SUBJECTIVE  Re-evaluated patient due to fever not responsive to tylenol or fluids  Patient reports feeling the same to maybe a little better compared with this morning. He reports pain is well controlled and his breathing feels better after albuterol nebs    During exam, wearing oxymask. Desatted to 85% on 4L while speaking and had to be increased to 5L       OBJECTIVE:  BP (!) 165/86 (BP Location: Left arm, Patient Position: Semi-Rausch's)   Pulse 97   Temp (!) 102.4  F (39.1  C) (Oral)   Resp 22   Ht 1.65 m (5' 4.96\")   Wt 93.3 kg (205 lb 11 oz)   SpO2 95%   BMI 34.27 kg/m      Exam:   General: Alert and oriented. Oxy mask in place.   Skin: Flushed, diaphoretic  Eyes: Moderate conjunctival injection bilaterally  CV: No cyanosis or pallor, warm and well perfused.   Respiratory: Tachypneic, shallow breathing. SPO2 95% on 5L O2  Neuro: No tremor        ASSESSMENT/PLAN:    # Sepsis, severe, due to complicated PNA  # Fever, tachycardia  # AHRF  Patient objectively appears worse but feels subjectively improved. Increasing oxygen requirement compared to this morning.   1L bolus LR over 2 hrs, then resume 125 ml/hr  No add'l testing  Continue abx: vanc/zosyn  Follow up Pulm and ID recommendations when finalized      Please see daily rounding note for full A/P.  Karon Dove MD  5:04 PM    "

## 2024-08-02 NOTE — PLAN OF CARE
Shift 0700 to 1930:    Goal Outcome Evaluation:      Plan of Care Reviewed With: patient    Overall Patient Progress: no change    Outcome Evaluation: Pt triggered sepsis this shift, orders obtained. Pt pain increasing this shift, PRN Oxycodone added.    Pt A&Ox4, denies CP, nausea, vomiting, diarrhea, constipation, numbness, and tingling. Pt endorses SOB, SpO2 >90% on 2L O2 NC. Pt developed productive cough this shift, coughing up yellow sputum, pt given specimen container to obtain lab. Given specimen container for urine collection as well. Independent in room. RUE PIV infusing NS @ 130 mL/hr, abx tolerated well. Visible skin intact except for L thumb necrosis. Regular diet, please help pt order meals with  during shift, takes medications whole with thin liquids. Pt has call light in reach, calls appropriately, and has no unanswered questions or concerns at end of shift. Continue POC.

## 2024-08-02 NOTE — CONSULTS
"      General Infectious Disease Service Consultation - [facility] [place]    Patient:  Ramón Cruz, Date of birth 1985, Medical record number [id number]  Date of Admission: 7/31/2024  Date of Visit:  8/2/2024  Requesting Provider: Maricel Martinez         Assessment and Recommendations:   Problem List:    # Acute onset of respiratory symptoms - CT with Right lower lung consolidation and pleural effusion    Discussion:    Mr. Ramón Cruz is a 39 year old male with no pertinent PMH who presented on 7/31/24 for 2 day hx of SOB, CP, neck pain, back pain, fever, and dry cough. Symptoms started 3 days prior to admission.      On arrival he has low grade fever and was tachycardic.  O2 94% on RA. Had mild leukocytosis to 11.9.  , pro-calcitonin 1.31. Creatinine normal. Alp phos, AST and ALT normal but bilirubin mildly elevated (2). UA without evidence of UTI. CXR suggestive of edema. Abdominal US did notn visualize gallbladder (cholecystectomy?). CT chest showed \"scattered consolidative opacities (R>L) likely representing a combination of multifocal pneumonia and atelectasis. Small loculated pleural effusion\". CT abdomen/pelvis showed \"Cortical wedge-shaped/linear hypoattenuating regions predominantly in the mid to lower poles of the kidneys bilaterally, concerning for pyelonephritis\". COVID PCR negative. Blood cultures negative to date. Quantiferon in process. Legionella and Strep pneumo ur antigen negative. Sputum culture with mixed respiratory nell. He was started on azithromycin and ceftriaxone on 7/31.  His white count continued to rise - 18.2 (8/1) - 18.2 (8/2) and he had fever o 8/1. New CXR from 8/1/24 showed \"increased opacities through the right lung and increased size of the small to moderate loculated right pleural effusion\". He is on 3L/min NC today (was on 4L/min on 7/31). He was transitioned to vancomycin and zosyn on 8/1/24. Respiratory panel negative. Beta D glucan, Blastomyces and Histoplasma " urine antigen ordered today and in process. MRSA swab negative    Recommendations:    Please stop vancomycin since MRSA swab is negative and respiratory culture is negative for resistant Gram positive organisms    Please continue zosyn     I was considering the possibilities of right diagnostic thoracentesis, however I see that pulmonology was also consulted and recommended to monitor with pleural US for now. I suspect he will most likely need a thoracentesis        - If thoracentesis is performed, please send to: Cell count and diff, glucose, protein (serum and pleural fluid), LDH (serum and pleural fluid), pH,  bacterial (aerobic and anaerobic) cultures, fungal cultures, Gram stain and fungal (KOH) smear.      I agree with Beta D glucan, Blastomyces and Histoplasma urine antigen (in process). Please also obtain Blastomyces and Histoplasma serum antigen, fungal antibody panel, Cryptococcus serum antigen    HIV test and quantiferon obtained on 7/31 and negative    Will continue to follow pending cultures (so lydia cultures negative)    Recommendations discussed with family medicine    Thank you for this consult. Thank you for this consult. The General ID team will continue to follow this patient. Please feel free to call with any question. Dr. Leigh will be covering the ID service over the weekend (8/3/24-8/4/24) and Dr. Flores with be covering  ID service next week starting on 8/5/24     ERNESTO RIVERA MD  Date of Service:  08/02/24  Pager: [phone]       History of Present Illness:   Mr. Ramón Cruz is a 39 year old male with no pertinent PMH who presented on 7/31/24 for 2 day hx of SOB, CP, neck pain, back pain, fever, and dry cough.      Patient reports that about 3 days prior to admission he started experiencing HA, neck pain, which spread to his shoulders and upper back and progressively got worse (9/10). He also started experiencing dry cough and chest pain, which made it difficult for him  "to breath normally.  Also endorsed subjective fevers and chills. Had 6-8 episodes of non-bloody, watery diarrhea on Tuesday, after his initial symptoms of pain, cough, and SOB began. Has not eaten much since then. Also reported dark yellow, malodorous urine, \"smelled like medicine,\" but no dysuria.      Additionally, patient injured his left thumb about a month ago, accidentally hit it with a hammer. He got multiple stitches, but endorses that his thumb has healed well since then. Denies pain, swelling, numbness, tingling over left thumb and hand.      ED Course (7/31):  VS: T 100.4 F, /66, , RR 22, O2 94% on RA. Mild leukocytosis to 11.9.  , pro-calcitonin 1.31. Creatinine normal. Alp phos,AST and ALT normal but bilirubin mildly elevated (2). UA without evidence of UTI. CXR suggestive of edema. Abdominal US did notn visualize gallbladder (cholecystectomy?). CT chest showed \"scattered consolidative opacities (R>L) likely representing a combination of multifocal pneumonia and atelectasis. Small loculated pleural effusion\". CT abdomen/pelvis showed \"Cortical wedge-shaped/linear hypoattenuating regions predominantly in the mid to lower poles of the kidneys bilaterally, concerning for pyelonephritis\". COVID PCR negative. Blood cultures negative to date. Quantiferon in process. Legionella and Strep pneumo ur antigen negative. Sputum culture with mixed respiratory nell. He was started on azithromycin and ceftriaxone on 7/31.  His white count continued to rise - 18.2 (8/1) - 18.2 (8/2) and he had fever o 8/1. New CXR from 8/1/24 showed \"increased opacities through the right lung and increased size of the small to moderate loculated right pleural effusion\". He is on 3L/min NC today (was on 4L/min on 7/31). He was transitioned to vancomycin and zosyn on 8/1/24. Beta D glucan, resp panel and MRSA swab ordered (in process).          Review of Systems:     CONSTITUTIONAL:  See HPI  INTEGUMENTARY/SKIN: " NEGATIVE for worrisome rashes, moles or lesions  EYES: Negative for icterus, vision changes or irritation  ENT/MOUTH:  Negative for oral lesions and sore throat  RESPIRATORY: See HPI  CARDIOVASCULAR:  Negative for chest pain, palpitations and  shortness of breath  GASTROINTESTINAL:  Negative for abdominal pain, nausea, vomiting, diarrhea and constipation  GENITOURINARY:  Negative for dysuria, hematuria, frequency and urgency  MUSCULOSKELETAL: Negative for joint pain, swelling, motion restriction, negative for musculoskeletal pain  NEURO:  Negative for headache, altered mental status, numbness or weakness  PSYCHIATRIC: Negative for changes in mood or affect  HEMATOLOGIC/LYMPHATIC: negative for lymphadenopathy or bleeding  ALLERGIC/IMMUNOLOGIC: Negative for allergic reaction   ENDOCRINE: Negative for temperature intolerance, skin/hair changes       Past Medical History:   See HPI      Allergies:    No Known Allergies         Family History:   Reviewed and noncontributory.          Social History:     Social History     Socioeconomic History    Marital status: Single     Spouse name: Not on file    Number of children: Not on file    Years of education: Not on file    Highest education level: Not on file   Occupational History    Not on file   Tobacco Use    Smoking status: Not on file    Smokeless tobacco: Not on file   Substance and Sexual Activity    Alcohol use: Not on file    Drug use: Not on file    Sexual activity: Not on file   Other Topics Concern    Not on file   Social History Narrative    Not on file     Social Determinants of Health     Financial Resource Strain: Not on file   Food Insecurity: Not on file   Transportation Needs: Not on file   Physical Activity: Not on file   Stress: Not on file   Social Connections: Not on file   Interpersonal Safety: Not on file   Housing Stability: Not on file       HOME/ENVIRONMENT/TRAVEL:  Originally from [place]. Has travelled to many countries in [place]. Moved  "to [place] 2 years ago. Has 3 kids back in [facility].   Lines by himself in [place]    OCCUPATION:   Not working currently    Denies drug use         Physical Exam:   BP (!) 136/93 (BP Location: Left arm, Patient Position: Semi-Rausch's, Cuff Size: Adult Regular)   Pulse 90   Temp 98.1  F (36.7  C) (Oral)   Resp 20   Ht 1.65 m (5' 4.96\")   Wt 93.3 kg (205 lb 11 oz)   SpO2 96%   BMI 34.27 kg/m         Exam:  GENERAL:  On O2 supplementation by NC. Having SOB  HEAD: Normocephalic and atraumatic  ENT:  No hearing impairment  EYES:  Eyes grossly normal to inspection  LUNGS:  Lung sounds decreased on right lower lung  CARDIOVASCULAR:  Regular rate and rhythm, normal S1 S2  ABDOMEN:  Soft, non-tender  EXT: Extremities warm and without edema.  MS: No gross musculoskeletal defects noted, no edema  SKIN:  No acute rashes or suspicious lesions  NEUROLOGIC:  Grossly nonfocal. Mentation intact and speech normal  PSYCHIATRIC: Mood stable, mentation appears normal, affect normal           Laboratory Data:     Creatinine   Date Value Ref Range Status   08/02/2024 0.90 0.67 - 1.17 mg/dL Final   08/01/2024 0.83 0.67 - 1.17 mg/dL Final   08/01/2024 0.89 0.67 - 1.17 mg/dL Final   08/01/2024 0.80 0.67 - 1.17 mg/dL Final   07/31/2024 0.82 0.67 - 1.17 mg/dL Final     WBC Count   Date Value Ref Range Status   08/02/2024 18.2 (H) 4.0 - 11.0 10e3/uL Final   08/01/2024 18.2 (H) 4.0 - 11.0 10e3/uL Final   08/01/2024 14.5 (H) 4.0 - 11.0 10e3/uL Final   08/01/2024 15.1 (H) 4.0 - 11.0 10e3/uL Final   07/31/2024 11.9 (H) 4.0 - 11.0 10e3/uL Final     Hemoglobin   Date Value Ref Range Status   08/02/2024 12.9 (L) 13.3 - 17.7 g/dL Final     Platelet Count   Date Value Ref Range Status   08/02/2024 351 150 - 450 10e3/uL Final     Lab Results   Component Value Date     (L) 08/02/2024    BUN 8.8 08/02/2024    CO2 23 08/02/2024     No results found for: \"CRP\"        Pertinent Recent Microbiology Data:   No results for input(s): \"CULT\", \"SDES\" in the " last 168 hours.         Imaging:     Recent Results (from the past 48 hour(s))   XR Chest 2 Views    Narrative    Chest 2 views    INDICATION: Short of breath, chest pain    COMPARISON: None    FINDINGS: Low inspiratory volume. Heart is borderline enlarged. Patchy  densities in the lower lungs and midlung fields suggestive of edema.  Cosmetic angles are not entirely sharp and may indicate trace pleural  effusions or other edema as well. Bony structures appear grossly  intact.      Impression    IMPRESSION: Suggestion of pulmonary edema which may be cardiogenic in  etiology.    NICK HARRINGTON MD         SYSTEM ID:  [id number]   US Abdomen Limited    Narrative    EXAMINATION: Limited Abdominal Ultrasound, 7/31/2024 2:42 PM     COMPARISON: None.    HISTORY: r/o cholecystitis, according to technologist patient had  gallbladder surgery at [facility]    FINDINGS:     Fluid: Right lower lung consolidation/atelectasis partially  visualized.    Liver: The liver demonstrates normal echotexture, measuring 15.7 cm in  craniocaudal dimension. There is no focal mass. Main portal vein is  patent with antegrade flow    Gallbladder: Not visualized.    Bile Ducts: No intrahepatic biliary ductal dilatation demonstrated.   The visualized common bile duct measures 5 mm in diameter.    Pancreas: Pancreas is partially obscured. Visualized portions of the  head and body of the pancreas are unremarkable.     Kidney: The right kidney measures 12 cm long. There is no  hydronephrosis or hydroureter, no shadowing renal calculi, cystic  lesion or mass.     Aorta and IVC: Proximal aorta and IVC are within normal limits.      Impression    IMPRESSION:     1. Gallbladder not visualized, possibly post cholecystectomy  2. Right lower lung consolidation/atelectasis partially visualized.    ORLANDO PINO MD         SYSTEM ID:  [id number]   CT Chest Pulmonary Embolism w Contrast    Narrative    EXAMINATION: CTA pulmonary angiogram, 7/31/2024 3:08 PM      COMPARISON: None.    HISTORY: Pleuritic chest pain, fever    TECHNIQUE: Volumetric helical acquisition of CT images of the chest  from the lung apices to the kidneys were acquired after the  administration of 80 mL of Isovue-370 IV contrast. Post-processed  multiplanar and/or MIP reformations were obtained, archived to PACS  and used in interpretation of this study.     FINDINGS:      Contrast bolus is: excellent.  Exam is negative for acute pulmonary  embolism. No CT evidence of right heart strain. The main pulmonary  artery is normal caliber measuring up to 2.9 cm.    Lungs: Central airways are patent. Scattered consolidative and streaky  opacities throughout all lobes. Scattered right greater than left  intralobular septal thickening and    Mediastinum: Borderline cardiomegaly. No pericardial effusion. Normal  caliber of the ascending thoracic aorta. No mediastinal  lymphadenopathy. Normal thyroid. Normal esophagus.    Upper abdomen: No acute abnormality.    Bones/Soft Tissues: No acute osseous abnormalities or suspicious bony  lesions. Soft tissues are unremarkable.      Impression    IMPRESSION:   1. No pulmonary embolism.  2. Scattered consolidative opacities likely representing a combination  of multifocal pneumonia and atelectasis.  3. Small loculated right pleural effusion.    I have personally reviewed the examination and initial interpretation  and I agree with the findings.    NICK HARRINGTON MD         SYSTEM ID:  [id number]   CT Abdomen Pelvis w Contrast    Narrative    Examination: CT ABDOMEN PELVIS W CONTRAST, 7/31/2024 3:09 PM    Technique:  Helical CT images from the lung bases through the  symphysis pubis were obtained with contrast.  Coronal reformatted  images were generated at a workstation for further assessment.    Contrast:  132 mL Isovue-370    Comparison: Ultrasound same day, CT chest same day.    History: Right-sided flank pain fever    Findings:    Abdomen and pelvis:   Liver: No  suspicious liver lesions.   Gallbladder: Surgically absent.   Spleen: Normal size.  Pancreas: No suspicious pancreatic lesions. Subcentimeter  hypoattenuating foci in the pancreatic head probably represent  interdigitating fat. The pancreatic duct is not dilated.  Adrenal glands: No adrenal nodules.  Urinary system: Cortical wedge-shaped/linear hypoattenuating regions  predominantly in the mid to lower poles of the kidneys bilaterally. No  kidney masses. No hydronephrosis, hydroureter, or obstructing renal  stones. Urinary bladder is unremarkable.  Reproductive organs: Unremarkable.  Bowel: No abnormally dilated loops of large or small bowel. No  abnormal bowel wall thickening or enhancement. Colonic diverticulosis  without evidence of acute diverticulitis. The appendix is  unremarkable.  Lymph nodes: No retroperitoneal, mesenteric, or pelvic  lymphadenopathy.  Fluid: No free fluid within the abdomen.  Vessels: No infrarenal aortic aneurysm. The portal, superior  mesenteric, and splenic veins are patent.    Lung bases: Consolidation in the right lower lobe. Streaky left  basilar consolidation. Please see same day dedicated chest CT for  dictation of thoracic findings.    Bones and soft tissues: No suspicious osseous lesions.      Impression    Impression:   1.  Cortical wedge-shaped/linear hypoattenuating regions predominantly  in the mid to lower poles of the kidneys bilaterally, concerning for  pyelonephritis.  2.  Consolidation in the right lower lobe. Streaky consolidation in  the left lower lobe. Please see same-day dedicated chest CT for  dictation of thoracic findings.    I have personally reviewed the examination and initial interpretation  and I agree with the findings.    TAB FAGAN MD         SYSTEM ID:  [id number]   XR Chest 2 Views    Narrative    Exam: XR CHEST 2 VIEWS, 8/1/2024 11:01 PM    Indication: increased SOB and oxygen requirements, known multifocal  PNA and small right pleural effusion,  currently on antibiotics    Comparison: Chest radiograph and chest CT 7/31/2024    Findings:   PA and lateral upright views of the chest were obtained. Continued low  lung volumes with stable cardiomediastinal silhouette. No  pneumothorax. Increased size of the small loculated right pleural  effusion. Increased mixed opacities throughout the right lung fields  with continued streaky left basilar opacities.      Impression    Impression:   1. Increased size of the small to moderate loculated right pleural  effusion.  2. Increased opacities throughout the right lung fields with continued  streaky left basilar opacities, suspicious for infection.  3. Low lung volumes.    I have personally reviewed the examination and initial interpretation  and I agree with the findings.    TAB FAGAN MD         SYSTEM ID:  [id number]

## 2024-08-02 NOTE — CONSULTS
"[facility]   Pulmonary Consult Note  Ramón Cruz MRN: [id number]      We were consulted by [facility] for evaluation of AHRF and management of PNA.      Assessment and Plan:     # AHRF 2/2 R side PNA with small loculated effusion   Ramón Cruz is a 39 year old male presenting with several days of SOB, cough, and fever found to have a R sided PNA. Most likely bacterial although fungal can also have a consolidative appearance and he does have risk factor being a \"berrios\" (histo and blasto ubiquitous in the soil/environment). He does not meet criteria for severe CAP and thus no steroids warranted. Given the dense consolidation seen on CT expect he will have a longer recovery course - intermittent fevers and still being on O2 is expected. As for the loculated effusion, we completed a bedside US and saw very minimal effusion which would not be amenable to thora/drainage/chest tube. The effusion should self resolve as the infection is being treated. This was all communicated to the patient and all questions were answered.     Recommendations:  - Narrow antibiotics. Stop Vanc/Zosyn and switch to Unasyn. On discharge can switch to Augmentin. 14 days total of abx (including everything he's received since admission)  - Histo and Blasto urine Ag added on   - Airway clearance therapy with: Albuterol neb TID + mucinex BID + aerobika TID. Can discharge with mucinex and aerobika, no need for neb on discharge   - Follow up with pulmonary in 6-8 weeks with repeat CT chest. We will arrange    Pulmonary will sign off    Patient seen and discussed with Dr. Presley Hayward MD   Pulmonary/Critical Care Fellow  Pager: [phone]          History of Present Illness:     Ramón Cruz is a 39 year old male who presented to [facility] on 7/31/2024 with several days of SOB, pleuritic pain, neck pain, fever, and a dry cough.  A phone interpretor was used.     The patient reported SOB, chest pain, neck " pain, fever, and dry cough that started two days before admission. No precipitating factors. Has not been around any sick contacts or had any dental procedures. Was feeling fine even a week ago. Has never had PNA as far as he can recall.     Here, was initially on CTX, Azithromycin, and Flagyl before transitioning to Vanc/Zosyn given persistent hypoxia and fevers (Tm 101.3F, now on schedule tylenol).  Legionella, Strep, HIV, quant gold, respiratory PCR all negative.     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 2 cigarettes daily for the past 18 years. Intermittently vapes. No cannabis.  Drinks 24 cans of beer on the weekend intermittently, last done 3 weeks ago. Took some white powder a few months ago but did no likely it.      Past Medical History:     EtOH Use  Prior positive cocaine on UDS      Allergies:     No Known Allergies     Outpatient Medications:     Current Facility-Administered Medications   Medication Dose Route Frequency Provider Last Rate Last Admin    acetaminophen (TYLENOL) tablet 975 mg  975 mg Oral Q8H Lety Graves MD   975 mg at 08/02/24 0547    Or    acetaminophen (TYLENOL) Suppository 650 mg  650 mg Rectal Q8H Lety Graves MD        albuterol (PROVENTIL) neb solution 2.5 mg  2.5 mg Nebulization Q4H PRN Bradley Laguna PA-C   2.5 mg at 08/02/24 0249    calcium carbonate (TUMS) chewable tablet 1,000 mg  1,000 mg Oral 4x Daily PRN Bradley Laguna PA-C        folic acid (FOLVITE) tablet 1 mg  1 mg Oral Daily Rita Medrano DO   1 mg at 08/02/24 0818    guaiFENesin-dextromethorphan (ROBITUSSIN DM) 100-10 MG/5ML syrup 10 mL  10 mL Oral Q4H PRN Bradley Laguna PA-C        HYDROmorphone (DILAUDID) injection 0.2 mg  0.2 mg Intravenous Q2H PRN Karon Dove MD        hydrOXYzine HCl (ATARAX) tablet 25 mg  25 mg Oral Q6H PRN Rita Medrano DO   25 mg at 08/01/24 7425    Or    hydrOXYzine HCl (ATARAX) tablet 50 mg  50 mg Oral Q6H PRN Rita Medrano DO        ipratropium - albuterol  0.5 mg/2.5 mg/3 mL (DUONEB) neb solution 3 mL  3 mL Nebulization Q4H PRN Rita Medrano DO        ketorolac (TORADOL) injection 15 mg  15 mg Intravenous Q6H Maricel Martinez MD        lactated ringers infusion   Intravenous Continuous ConnieRobert  mL/hr at 08/02/24 1052 New Bag at 08/02/24 1052    Lidocaine (LIDOCARE) 4 % Patch 1 patch  1 patch Transdermal Q24H Rita Medrano DO   1 patch at 08/02/24 0542    lidocaine (LMX4) cream   Topical Q1H PRN Bradley Laguna PA-C        lidocaine 1 % 0.1-1 mL  0.1-1 mL Other Q1H PRN Bradley Laguna PA-C        naloxone (NARCAN) injection 0.2 mg  0.2 mg Intravenous Q2 Min PRN Maricel Martinez MD        Or    naloxone (NARCAN) injection 0.4 mg  0.4 mg Intravenous Q2 Min PRN Maricel Martinez MD        Or    naloxone (NARCAN) injection 0.2 mg  0.2 mg Intramuscular Q2 Min PRN Maricel Martinez MD        Or    naloxone (NARCAN) injection 0.4 mg  0.4 mg Intramuscular Q2 Min PRN Maricel Martinez MD        ondansetron (ZOFRAN ODT) ODT tab 4 mg  4 mg Oral Q6H PRN Bradley Laguna PA-C        Or    ondansetron (ZOFRAN) injection 4 mg  4 mg Intravenous Q6H PRN Bradley Laguna PA-C        oxyCODONE IR (ROXICODONE) half-tab 2.5 mg  2.5 mg Oral Q4H PRN Promise Bardales, DO        Or    oxyCODONE (ROXICODONE) tablet 5 mg  5 mg Oral Q4H PRN Promise Bardales DO   5 mg at 08/02/24 0818    piperacillin-tazobactam (ZOSYN) 4.5 g vial to attach to  mL bag  4.5 g Intravenous Q6H Rita Medrano  mL/hr at 08/02/24 1011 4.5 g at 08/02/24 1011    senna-docusate (SENOKOT-S/PERICOLACE) 8.6-50 MG per tablet 1 tablet  1 tablet Oral BID PRN Bradley Laguna PA-C        Or    senna-docusate (SENOKOT-S/PERICOLACE) 8.6-50 MG per tablet 2 tablet  2 tablet Oral BID PRN Bradley Laguna PA-C        sodium chloride (PF) 0.9% PF flush 3 mL  3 mL Intracatheter Q8H Bradley Laguna PA-C   3 mL at 08/01/24 2140    sodium chloride (PF) 0.9% PF flush 3 mL  3 mL Intracatheter q1 min  "Bradley Puckett PA-C   3 mL at 08/01/24 0423    [Held by provider] sodium chloride 0.9 % infusion   Intravenous Continuous Lety Graves MD   Stopped at 08/01/24 2232    thiamine (B-1) tablet 100 mg  100 mg Oral Daily Eddie MedranoineDO   100 mg at 08/02/24 0818    vancomycin (VANCOCIN) 1,500 mg in 0.9% NaCl 250 mL intermittent infusion  1,500 mg Intravenous Q12H Maricel Martinez .7 mL/hr at 08/02/24 0050 1,500 mg at 08/02/24 0050             Family History:     No family history on file.       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[employer]   - smokes 2 cigarettes a day for 18 years  - hx of cocaine use  - smoked new drug a month ago, that he cannot recall       Physical Exam:     BP (!) 136/93 (BP Location: Left arm, Patient Position: Semi-Rausch's, Cuff Size: Adult Regular)   Pulse 90   Temp 98.1  F (36.7  C) (Oral)   Resp 20   Ht 1.65 m (5' 4.96\")   Wt 93.3 kg (205 lb 11 oz)   SpO2 96%   BMI 34.27 kg/m      General: male in no acute distress  Lungs: decreased breath sound on the R base, no accessory muscle use  Heart: RRR, no murmurs  Abdomen: soft, non-distended, bowel tones present  Extremities:  no BLE edema   Skin: no visible rashes, no mottling  Neurologic: moving all 4 extremities spontaneously, awake and alert     Data:     Labs (all laboratory studies reviewed by me): notable labs in HPI above.    CMP:  Na+: 133  Ca2+: 8.0  Albumin: 2.9  Bilirubin: 1.3  Procal: 1.44    Gases/oximetry:  O2 sat, venous: 76.0    CBC:  WBC: 18.2  Hgb: 12.9    CT Chest  7/31:  IMPRESSION:   1. No pulmonary embolism.  2. Scattered consolidative opacities likely representing a combination  of multifocal pneumonia and atelectasis.  3. Small loculated right pleural effusion.  "

## 2024-08-03 ENCOUNTER — APPOINTMENT (OUTPATIENT)
Dept: GENERAL RADIOLOGY | Facility: CLINIC | Age: 39
End: 2024-08-03
Payer: MEDICAID

## 2024-08-03 LAB
1,3 BETA GLUCAN SER-MCNC: <31 PG/ML
ALBUMIN SERPL BCG-MCNC: 2.7 G/DL (ref 3.5–5.2)
ALP SERPL-CCNC: 79 U/L (ref 40–150)
ALT SERPL W P-5'-P-CCNC: 14 U/L (ref 0–70)
ANION GAP SERPL CALCULATED.3IONS-SCNC: 10 MMOL/L (ref 7–15)
AST SERPL W P-5'-P-CCNC: 9 U/L (ref 0–45)
ATRIAL RATE - MUSE: 93 BPM
BILIRUB SERPL-MCNC: 0.9 MG/DL
BUN SERPL-MCNC: 7.9 MG/DL (ref 6–20)
CALCIUM SERPL-MCNC: 8.4 MG/DL (ref 8.8–10.4)
CHLORIDE SERPL-SCNC: 106 MMOL/L (ref 98–107)
CREAT SERPL-MCNC: 0.79 MG/DL (ref 0.67–1.17)
CREAT SERPL-MCNC: 0.82 MG/DL (ref 0.67–1.17)
CRYPTOC AG SPEC QL: NEGATIVE
DIASTOLIC BLOOD PRESSURE - MUSE: NORMAL MMHG
EGFRCR SERPLBLD CKD-EPI 2021: >90 ML/MIN/1.73M2
EGFRCR SERPLBLD CKD-EPI 2021: >90 ML/MIN/1.73M2
ERYTHROCYTE [DISTWIDTH] IN BLOOD BY AUTOMATED COUNT: 13.9 % (ref 10–15)
GLUCOSE SERPL-MCNC: 104 MG/DL (ref 70–99)
HCO3 SERPL-SCNC: 22 MMOL/L (ref 22–29)
HCT VFR BLD AUTO: 36.1 % (ref 40–53)
HGB BLD-MCNC: 12.2 G/DL (ref 13.3–17.7)
INTERPRETATION ECG - MUSE: NORMAL
LACTATE SERPL-SCNC: 0.7 MMOL/L (ref 0.7–2)
LACTATE SERPL-SCNC: 1.1 MMOL/L (ref 0.7–2)
MCH RBC QN AUTO: 30.7 PG (ref 26.5–33)
MCHC RBC AUTO-ENTMCNC: 33.8 G/DL (ref 31.5–36.5)
MCV RBC AUTO: 91 FL (ref 78–100)
NT-PROBNP SERPL-MCNC: 218 PG/ML (ref 0–450)
OBSERVATION IMP: NEGATIVE
P AXIS - MUSE: 26 DEGREES
PLATELET # BLD AUTO: 358 10E3/UL (ref 150–450)
POTASSIUM SERPL-SCNC: 3.4 MMOL/L (ref 3.4–5.3)
PR INTERVAL - MUSE: 132 MS
PROT SERPL-MCNC: 6.2 G/DL (ref 6.4–8.3)
QRS DURATION - MUSE: 74 MS
QT - MUSE: 356 MS
QTC - MUSE: 442 MS
R AXIS - MUSE: 58 DEGREES
RBC # BLD AUTO: 3.97 10E6/UL (ref 4.4–5.9)
SODIUM SERPL-SCNC: 138 MMOL/L (ref 135–145)
SYSTOLIC BLOOD PRESSURE - MUSE: NORMAL MMHG
T AXIS - MUSE: 4 DEGREES
TROPONIN T SERPL HS-MCNC: 8 NG/L
VENTRICULAR RATE- MUSE: 93 BPM
WBC # BLD AUTO: 17.7 10E3/UL (ref 4–11)

## 2024-08-03 PROCEDURE — 82565 ASSAY OF CREATININE: CPT

## 2024-08-03 PROCEDURE — 250N000013 HC RX MED GY IP 250 OP 250 PS 637

## 2024-08-03 PROCEDURE — 272N000202 HC AEROBIKA WITH MANOMETER

## 2024-08-03 PROCEDURE — 250N000011 HC RX IP 250 OP 636

## 2024-08-03 PROCEDURE — 999N000156 HC STATISTIC RCP CONSULT EA 30 MIN

## 2024-08-03 PROCEDURE — 250N000011 HC RX IP 250 OP 636: Performed by: FAMILY MEDICINE

## 2024-08-03 PROCEDURE — 85027 COMPLETE CBC AUTOMATED: CPT

## 2024-08-03 PROCEDURE — 258N000003 HC RX IP 258 OP 636: Performed by: FAMILY MEDICINE

## 2024-08-03 PROCEDURE — 83605 ASSAY OF LACTIC ACID: CPT

## 2024-08-03 PROCEDURE — 83880 ASSAY OF NATRIURETIC PEPTIDE: CPT

## 2024-08-03 PROCEDURE — 120N000002 HC R&B MED SURG/OB UMMC

## 2024-08-03 PROCEDURE — 258N000003 HC RX IP 258 OP 636

## 2024-08-03 PROCEDURE — 250N000009 HC RX 250

## 2024-08-03 PROCEDURE — 999N000157 HC STATISTIC RCP TIME EA 10 MIN

## 2024-08-03 PROCEDURE — 94640 AIRWAY INHALATION TREATMENT: CPT

## 2024-08-03 PROCEDURE — 87385 HISTOPLASMA CAPSUL AG IA: CPT

## 2024-08-03 PROCEDURE — 87899 AGENT NOS ASSAY W/OPTIC: CPT

## 2024-08-03 PROCEDURE — 82040 ASSAY OF SERUM ALBUMIN: CPT

## 2024-08-03 PROCEDURE — 84484 ASSAY OF TROPONIN QUANT: CPT

## 2024-08-03 PROCEDURE — 87449 NOS EACH ORGANISM AG IA: CPT

## 2024-08-03 PROCEDURE — 36415 COLL VENOUS BLD VENIPUNCTURE: CPT

## 2024-08-03 PROCEDURE — 94799 UNLISTED PULMONARY SVC/PX: CPT

## 2024-08-03 PROCEDURE — 93005 ELECTROCARDIOGRAM TRACING: CPT

## 2024-08-03 PROCEDURE — 94640 AIRWAY INHALATION TREATMENT: CPT | Mod: 76

## 2024-08-03 PROCEDURE — 71046 X-RAY EXAM CHEST 2 VIEWS: CPT

## 2024-08-03 PROCEDURE — 99232 SBSQ HOSP IP/OBS MODERATE 35: CPT | Mod: GC

## 2024-08-03 PROCEDURE — 71046 X-RAY EXAM CHEST 2 VIEWS: CPT | Mod: 26 | Performed by: RADIOLOGY

## 2024-08-03 RX ORDER — ALBUTEROL SULFATE 0.83 MG/ML
2.5 SOLUTION RESPIRATORY (INHALATION)
Status: DISCONTINUED | OUTPATIENT
Start: 2024-08-03 | End: 2024-08-07 | Stop reason: HOSPADM

## 2024-08-03 RX ORDER — ENOXAPARIN SODIUM 100 MG/ML
40 INJECTION SUBCUTANEOUS EVERY 24 HOURS
Status: DISCONTINUED | OUTPATIENT
Start: 2024-08-03 | End: 2024-08-05

## 2024-08-03 RX ORDER — IPRATROPIUM BROMIDE AND ALBUTEROL SULFATE 2.5; .5 MG/3ML; MG/3ML
3 SOLUTION RESPIRATORY (INHALATION) 3 TIMES DAILY
Status: DISCONTINUED | OUTPATIENT
Start: 2024-08-03 | End: 2024-08-03

## 2024-08-03 RX ORDER — ACETAMINOPHEN 325 MG/1
325 TABLET ORAL ONCE
Status: COMPLETED | OUTPATIENT
Start: 2024-08-03 | End: 2024-08-03

## 2024-08-03 RX ADMIN — ACETAMINOPHEN 975 MG: 325 TABLET, FILM COATED ORAL at 14:17

## 2024-08-03 RX ADMIN — SODIUM CHLORIDE, POTASSIUM CHLORIDE, SODIUM LACTATE AND CALCIUM CHLORIDE: 600; 310; 30; 20 INJECTION, SOLUTION INTRAVENOUS at 00:04

## 2024-08-03 RX ADMIN — ALBUTEROL SULFATE 2.5 MG: 2.5 SOLUTION RESPIRATORY (INHALATION) at 14:54

## 2024-08-03 RX ADMIN — KETOROLAC TROMETHAMINE 15 MG: 15 INJECTION, SOLUTION INTRAMUSCULAR; INTRAVENOUS at 00:03

## 2024-08-03 RX ADMIN — THIAMINE HCL TAB 100 MG 100 MG: 100 TAB at 09:17

## 2024-08-03 RX ADMIN — ALBUTEROL SULFATE 2.5 MG: 2.5 SOLUTION RESPIRATORY (INHALATION) at 20:23

## 2024-08-03 RX ADMIN — ACETAMINOPHEN 975 MG: 325 TABLET, FILM COATED ORAL at 05:40

## 2024-08-03 RX ADMIN — PIPERACILLIN AND TAZOBACTAM 4.5 G: 4; .5 INJECTION, POWDER, LYOPHILIZED, FOR SOLUTION INTRAVENOUS at 12:20

## 2024-08-03 RX ADMIN — OXYCODONE HYDROCHLORIDE 5 MG: 5 TABLET ORAL at 20:07

## 2024-08-03 RX ADMIN — KETOROLAC TROMETHAMINE 15 MG: 15 INJECTION, SOLUTION INTRAMUSCULAR; INTRAVENOUS at 12:11

## 2024-08-03 RX ADMIN — VANCOMYCIN HYDROCHLORIDE 1500 MG: 10 INJECTION, POWDER, LYOPHILIZED, FOR SOLUTION INTRAVENOUS at 01:27

## 2024-08-03 RX ADMIN — IPRATROPIUM BROMIDE AND ALBUTEROL SULFATE 3 ML: .5; 3 SOLUTION RESPIRATORY (INHALATION) at 09:02

## 2024-08-03 RX ADMIN — OXYCODONE HYDROCHLORIDE 5 MG: 5 TABLET ORAL at 10:05

## 2024-08-03 RX ADMIN — ACETAMINOPHEN 975 MG: 325 TABLET, FILM COATED ORAL at 21:21

## 2024-08-03 RX ADMIN — ENOXAPARIN SODIUM 40 MG: 40 INJECTION SUBCUTANEOUS at 10:01

## 2024-08-03 RX ADMIN — LIDOCAINE 1 PATCH: 4 PATCH TOPICAL at 09:18

## 2024-08-03 RX ADMIN — FOLIC ACID 1 MG: 1 TABLET ORAL at 09:17

## 2024-08-03 RX ADMIN — SODIUM CHLORIDE, POTASSIUM CHLORIDE, SODIUM LACTATE AND CALCIUM CHLORIDE: 600; 310; 30; 20 INJECTION, SOLUTION INTRAVENOUS at 18:36

## 2024-08-03 RX ADMIN — PIPERACILLIN AND TAZOBACTAM 4.5 G: 4; .5 INJECTION, POWDER, LYOPHILIZED, FOR SOLUTION INTRAVENOUS at 17:09

## 2024-08-03 RX ADMIN — ACETAMINOPHEN 325 MG: 325 TABLET, FILM COATED ORAL at 17:09

## 2024-08-03 RX ADMIN — IPRATROPIUM BROMIDE AND ALBUTEROL SULFATE 3 ML: .5; 3 SOLUTION RESPIRATORY (INHALATION) at 06:33

## 2024-08-03 RX ADMIN — KETOROLAC TROMETHAMINE 15 MG: 15 INJECTION, SOLUTION INTRAMUSCULAR; INTRAVENOUS at 18:36

## 2024-08-03 RX ADMIN — PIPERACILLIN AND TAZOBACTAM 4.5 G: 4; .5 INJECTION, POWDER, LYOPHILIZED, FOR SOLUTION INTRAVENOUS at 05:40

## 2024-08-03 RX ADMIN — KETOROLAC TROMETHAMINE 15 MG: 15 INJECTION, SOLUTION INTRAMUSCULAR; INTRAVENOUS at 23:59

## 2024-08-03 RX ADMIN — GUAIFENESIN AND DEXTROMETHORPHAN 10 ML: 100; 10 SYRUP ORAL at 18:51

## 2024-08-03 RX ADMIN — KETOROLAC TROMETHAMINE 15 MG: 15 INJECTION, SOLUTION INTRAMUSCULAR; INTRAVENOUS at 05:40

## 2024-08-03 ASSESSMENT — ACTIVITIES OF DAILY LIVING (ADL)
ADLS_ACUITY_SCORE: 18

## 2024-08-03 NOTE — PLAN OF CARE
"Goal Outcome Evaluation:      Plan of Care Reviewed With: patient    Overall Patient Progress: improvingOverall Patient Progress: improving       VS: Blood pressure 137/86, pulse 97, temperature 99  F (37.2  C), temperature source Oral, resp. rate 16, height 1.65 m (5' 4.96\"), weight 93.3 kg (205 lb 11 oz), SpO2 94%.    O2: SpO2>90% on 2 LPM via NC, sats dip upon exertion, and with pain to R lower back. C/o SOB and CP.    Output: Voids spontaneously without difficulty in BSU   Last BM: 8/3/24   Activity: Independent w/ steady gait   Skin: Visible skin intact, declined skin check, scab to L thumb   Pain: Managed with pain medications   CMS: A&O x 4; denies numbness/tingling   Dressing: None   Diet: Regular   LDA: R PIV infusing 125 ml/hr LR; R chest port, deaccessed.   Equipment: IV pole, personal belongings   Plan: Continue POC.   Additional Info: Belarusian speaking       "

## 2024-08-03 NOTE — PROGRESS NOTES
"[facility]   BRIEF PROGRESS NOTE    SUBJECTIVE  Assessed pt for baseline for the night. Pt reports that he is feeling a little better compared to when he first arrived. Still experiencing chest pain with breathing, but improved compared to when he first arrived. Is able to walk around without feeling too short of breath. Has been able to produce some sputum as of yesterday. Still feel fever-ca and has been sweating intermittently. Ongoing right back pain, which improved with the lidocaine patch. Denies N/V/D.     OBJECTIVE:  BP (!) 165/86 (BP Location: Left arm, Patient Position: Semi-Rausch's)   Pulse 97   Temp (!) 101.1  F (38.4  C) (Oral)   Resp 22   Ht 1.65 m (5' 4.96\")   Wt 93.3 kg (205 lb 11 oz)   SpO2 94%   BMI 34.27 kg/m      Exam:   General: Alert and oriented, not wearing NC.  CV: No cyanosis or pallor, warm and well perfused.  Respiratory: No accessory muscle use. Increased WOB, especially when talking. Increased breath sounds in b/l upper lobes. No wheezing or crackles noted.   Neuro: Gait and station normal, comprehension intact. Gross and fine motor skills intact.   Psychiatric: Mood and affect appear normal.   Extremities: Warm, able to move all four extremities at will.      ASSESSMENT/PLAN:  # AHRF 2/2 PNA  # Complicated PNA, loculated R pleural infusion  # Fever, tachycardia  No new changes in management. Pt is febrile and hypertensive, but WOB of breathing has not worsened. Pain is controlled with lidocaine patch, tylenol, toradol, dilaudid and oxy. Continue 4L O2 via nasal cannula, duo neb q4h if awake. Will continue to monitor VS and any new labs overnight.     Please see daily rounding note for full A/P.  Lety Graves MD  8:43 PM     Addendum @ ~0600:  RN paged to inform us that Ramón was complaining of new onset chest pain, worsening SOB, orthopnea. Upon meeting with Ramón, he reports that he feels like his fever and right pleuritic pain has improved (now rated a 5/10 on " pain scale instead of 10/10), but he feels chest heaviness, dyspnea that does not improve at rest, and new onset orthopnea that began within the past couple of hours. He reports that he has been coughing more and showed me a cup that was 1/3 filled with dark, thin, yellow phlegm. No radiating pain to jaw or left arm. Physical exam revealed normal heart sounds with no murmurs, rubs, or gallops. There were no breath sounds in RLL with mild wheezing throughout.   - STAT EKG, trop, BNP, given new onset chest heaviness and orthopnea  - STAT CXR given no breath sounds in both anterior and posterior RLL. Will assess if pleural effusion is still growing.  - Spoke to RN to give DuoNeb treatment for wheezing  - Recommend echocardiogram to assess for pulmonary HTN / right heart strain in the setting of multifocal PNA and expanding right pleural effusion  Rita Medrano DO, MPH

## 2024-08-03 NOTE — PLAN OF CARE
Goal Outcome Evaluation:    VS: VSS   Triggered sepsis, provider updated, labs stable, vitals remained stable.    O2: On 4Lpm via NC, sats dip upon exertion, and with pain to R lower back  C/O SOB and chest pain. Provider updated and saw Pt, orders for workup and genny.    Output: Voids spontaneously without difficulty in BSU   Activity: Independent w/ steady gait   Skin: Visible skin intact, declined skin check, scab to L thumb   Pain: Managed with pain medications    CMS: Intact, denies numbness/tingling   Dressing: No dressings   Diet: Regular   LDA: (R) PIV SL, (R) port a cath, deaccessed   Equipment: IV pole, personal belongings   Plan: TBD

## 2024-08-03 NOTE — PROGRESS NOTES
[facility]    Family Medicine Progress Note - [facility]       Main Plans for Today   - Echo  - STOP Vanco per ID recs, continue Zosyn   - START lovenox for DVT prophylaxis      Assessment & Plan   Ramón Cruz is a 39 year old male admitted on 7/31/2024. He has no pertinent PMHx and was admitted on 7/31/2024 for dyspnea, pleuritic chest pain, fever, cough found to have multifocal PNA with loculated pleural effusion.     Multifocal complicated pneumonia  Acute hypoxic respiratory failure s/t pneumonia  Sepsis (WBC, fever, HR) non-severe, due to unknown organism  Multiloculated R pleural effusion   Low lung volumes  Elevated CRP (8/1 = 454)   Presented to ED for 2-day cough, dyspnea, and fever, later admitted for multifocal PNA (confirmed by CXR and CT chest), small R loculated pleural effusion, and hypoxia s/t PNA. Currently requiring 2L NC O2,  compared to previous 4-6L O2 requirement. Sepsis protocol activated on 8/1 for WBC 15, , and temp of 101. LA 0.7, respiratory panel negative, WBC slightly improved to 17.7 today. Overnight events include new onset chest tightness, worsening SOB, and new orthopnea. Given new onset chest heaviness and orthopnea overnight, STAT EKG, Trop, BNP ordered, all of which were normal. STAT CXR today revealed stable large R pleural effusion. To rule out possible cardiogenic etiologies of pulmonary HTN, ordered echocardiogram, despite no new clinical signs of fluid overload, unremarkable cardio exam, and Hx of IVDA. ID consulted and recommended discontinuation of vancomycin given negative MRSA swab and respiratory culture for resistance Gram positive organisms. Pulm consulted and ordered blasto/histo studies along with a airway clearance regimen (albuterol neb TID + mucinex BID + aerobika TID). Patient started on Lovenox treatment for DVT prophylaxis. Continue to monitor vitals.     Imaging: Echo, results pending     Labs:  daily CBC, daily CMP, creatinine, platelet (every 3 days)    Pain management:  - tylenol 975mg Q8H  - ketorolac 15mg IV Q6H  - oxycodone 2.5mg vs. 5mg Q4H PRN  - dilaudid 0.2mg IV Q2H PRN    Abx:  - DISCONTINUE Vancomycin  - continue Zosyn 4.5g IV per ID recs   - S/P Ceftriaxone 2g IV + Zithromax 500mg IV (7/31 - 8/1)  - S/P Metronidazole 500mg  IV (8/1)    Consults: ID + Pulmnology    Monitoring:    - Vitals Q4H   - Continuous pulse ox    Cortical wedge-shaped hypoattentuation of kidneys  7/31/2024 CT abdomen reveals cortical wedge-shaped linear hypoattenuating regions in the mid to lower poles of kidneys bilaterally concerning for pyelonephritis. No urinary sxs, UA unremarkable, and urine culture negative for growth. Radiology confirmed most likely pyelonephritis, no concern for vascular pathologies at this time.  -  Tx covered by Zosyn Abx as discussed above     Opioid induced constipation prevention  Encouraged pt to use bowel PRNs  - Senna PRN  - Mirilax BID PRN    Moderate hyponatremia - resolved  Na 138 today. Suspect due to acute illness  - Treatment as above    Hyperbilirubinemia - resolved   H/o elevated LFTs  During April visit to ED,  and AST 58, normal bili. This admission, bilirubin is 2.0, other LFTs unremarkable. Bili normalized to 0.9 today. Cholecystectomy ~3 years ago.     Alcohol use  Per patient report drinks 24 beers in 1 day every 2 weeks.  - Thiamine 100mg daily  - Folic acid 1mg daily    L thumb wound, suspicious for necrosis  Trauma to the thumb ~1 month ago. Black, necrotic tissue at the tip of the thumb. However, no erythema, tenderness, or discharge and so do not suspect this as source of infection.  - No management necessary as of now.     H/o cocaine use  Documentation from April 2024 noting screen positives for cocaine w/ associated confusion. No current use.     Diet: Combination Diet Regular Diet Adult  Fluids: PO  Lines: PIV with LR 125mL/hr   DVT Prophylaxis: Pneumatic  Compression Devices  Code Status: Full Code    Disposition Plan   Expected discharge: 2 - 3 days, recommended to prior living arrangement once adequate pain management/ tolerating PO medications, antibiotic plan established, and SIRS/Sepsis treated. Dispo:       Entered: Promise Bardales DO 08/03/2024, 12:33 PM    The patient's care was discussed with the Attending Physician, Dr. Martinez, Chief Resident/Fellow, and Patient.    Promise Bardales DO  [facility]  Pager: [phone]  Please see sticky note for cross cover information    ----------------------  Interval History     Compared to yesterday, back pain and breathing mildly improved. Overnight, did not sleep well due to pain with breathing, pain located lower rib cage. Felt feverish last night. Has been coughing up phlegm. Denies new chest pain, palpitations, coughing up blood, headaches, or new leg swelling.     Physical Exam   Vital Signs: Temp: 98.6  F (37  C) Temp src: Oral BP: 128/77 Pulse: 81   Resp: 18 SpO2: 97 % O2 Device: None (Room air) Oxygen Delivery: 4 LPM  Weight: 205 lbs 11.03 oz  General Appearance: Pt lying supine, quite sleepy but arousable and engages in conversation appropriately. No acute distress.   Respiratory: On 2L NC O2. Improved tachypnea, No accessory muscle use. Crackles LLL. Reduced breath sounds in the RLL with bronchial breathsounds mid to upper field on the right.   Cardiovascular: S1S2, RRR. No murmurs, rubs, or gallops.  Ext: no edema      Data   Recent Labs   Lab 08/03/24  1115 08/03/24  0657 08/02/24  0524 08/02/24  0522 08/01/24  2217   WBC  --  17.7* 18.2*  --  18.2*   HGB  --  12.2* 12.9*  --  13.3   MCV  --  91 91  --  91   PLT  --  358 351  --  360   NA  --  138  --  133* 133*   POTASSIUM  --  3.4  --  3.7 3.8   CHLORIDE  --  106  --  100 101   CO2  --  22  --  23 19*   BUN  --  7.9  --  8.8 10.0   CR 0.79 0.82  --  0.90 0.83   ANIONGAP  --  10  --  10 13   TALA  --  8.4*  --  8.0*  8.2*   GLC  --  104*  --  102* 118*   ALBUMIN  --  2.7*  --  2.9* 2.9*   PROTTOTAL  --  6.2*  --  6.5 6.8   BILITOTAL  --  0.9  --  1.3* 0.9   ALKPHOS  --  79  --  75 95   ALT  --  14  --  14 17   AST  --  9  --  9 8       Imaging results reviewed over the past 24 hrs:   Recent Results (from the past 24 hour(s))   XR Chest 2 Views    Narrative    EXAM:  XR CHEST 2 VIEWS    INDICATION: sudden onset SOB, orthopnea, chest tightness, persistent  wheezing, no breath sounds on RLL    COMPARISON:  Chest x-ray 8/1/2024    FINDINGS:  PA and lateral views of the chest.    Obscured right cardiac border. Midline trachea. Near complete  opacification of the right lung fields with slight relative aeration  of the right upper lung field. No pneumothorax.  Right pleural  effusion.       Impression    IMPRESSION:  Stable large right pleural effusion with adjacent compressive  consolidation.    I have personally reviewed the examination and initial interpretation  and I agree with the findings.    OTILIA PISANO MD         SYSTEM ID:  [id number]

## 2024-08-03 NOTE — PLAN OF CARE
Goal Outcome Evaluation: ongoing     Neuro/Orientation: A&Ox4, Kazakh speaking. No numbness/tingling.  Respiratory: 4L O2 via NCm dyspnea on exertion. Ongoing tight/heavy chest, providers aware.  Activity/Transfer: Ind  Diet: Reg/thin, pills whole.  GI/: Continent of bowel and bladder, LBM 7/30  Skin: Necrotic left thumb  Pain: Lower back pain, PRN oxy/atarax given.  LDA: R PIV infusing LR 125ml/hr  Plan: IV abx, pulmonary/infectious disease consults pending

## 2024-08-04 ENCOUNTER — APPOINTMENT (OUTPATIENT)
Dept: CARDIOLOGY | Facility: CLINIC | Age: 39
End: 2024-08-04
Payer: MEDICAID

## 2024-08-04 LAB
ALBUMIN SERPL BCG-MCNC: 2.7 G/DL (ref 3.5–5.2)
ALP SERPL-CCNC: 117 U/L (ref 40–150)
ALT SERPL W P-5'-P-CCNC: 18 U/L (ref 0–70)
ANION GAP SERPL CALCULATED.3IONS-SCNC: 12 MMOL/L (ref 7–15)
AST SERPL W P-5'-P-CCNC: 17 U/L (ref 0–45)
BACTERIA SPT CULT: NORMAL
BILIRUB SERPL-MCNC: 0.7 MG/DL
BUN SERPL-MCNC: 6.7 MG/DL (ref 6–20)
CALCIUM SERPL-MCNC: 8.7 MG/DL (ref 8.8–10.4)
CHLORIDE SERPL-SCNC: 104 MMOL/L (ref 98–107)
CREAT SERPL-MCNC: 0.85 MG/DL (ref 0.67–1.17)
EGFRCR SERPLBLD CKD-EPI 2021: >90 ML/MIN/1.73M2
ERYTHROCYTE [DISTWIDTH] IN BLOOD BY AUTOMATED COUNT: 14.5 % (ref 10–15)
GLUCOSE SERPL-MCNC: 107 MG/DL (ref 70–99)
GRAM STAIN RESULT: NORMAL
HCO3 SERPL-SCNC: 21 MMOL/L (ref 22–29)
HCT VFR BLD AUTO: 34.6 % (ref 40–53)
HGB BLD-MCNC: 11.7 G/DL (ref 13.3–17.7)
LACTATE SERPL-SCNC: 0.9 MMOL/L (ref 0.7–2)
LVEF ECHO: NORMAL
MCH RBC QN AUTO: 30.5 PG (ref 26.5–33)
MCHC RBC AUTO-ENTMCNC: 33.8 G/DL (ref 31.5–36.5)
MCV RBC AUTO: 90 FL (ref 78–100)
PLATELET # BLD AUTO: 414 10E3/UL (ref 150–450)
POTASSIUM SERPL-SCNC: 3.4 MMOL/L (ref 3.4–5.3)
PROT SERPL-MCNC: 6.7 G/DL (ref 6.4–8.3)
RBC # BLD AUTO: 3.83 10E6/UL (ref 4.4–5.9)
SODIUM SERPL-SCNC: 137 MMOL/L (ref 135–145)
WBC # BLD AUTO: 13.1 10E3/UL (ref 4–11)

## 2024-08-04 PROCEDURE — 250N000013 HC RX MED GY IP 250 OP 250 PS 637

## 2024-08-04 PROCEDURE — 255N000002 HC RX 255 OP 636: Performed by: STUDENT IN AN ORGANIZED HEALTH CARE EDUCATION/TRAINING PROGRAM

## 2024-08-04 PROCEDURE — 85027 COMPLETE CBC AUTOMATED: CPT

## 2024-08-04 PROCEDURE — 120N000002 HC R&B MED SURG/OB UMMC

## 2024-08-04 PROCEDURE — 36415 COLL VENOUS BLD VENIPUNCTURE: CPT

## 2024-08-04 PROCEDURE — 250N000011 HC RX IP 250 OP 636

## 2024-08-04 PROCEDURE — 94799 UNLISTED PULMONARY SVC/PX: CPT

## 2024-08-04 PROCEDURE — 94640 AIRWAY INHALATION TREATMENT: CPT | Mod: 76

## 2024-08-04 PROCEDURE — 99232 SBSQ HOSP IP/OBS MODERATE 35: CPT | Mod: GC

## 2024-08-04 PROCEDURE — 250N000009 HC RX 250

## 2024-08-04 PROCEDURE — 93306 TTE W/DOPPLER COMPLETE: CPT | Mod: 26 | Performed by: STUDENT IN AN ORGANIZED HEALTH CARE EDUCATION/TRAINING PROGRAM

## 2024-08-04 PROCEDURE — 258N000003 HC RX IP 258 OP 636

## 2024-08-04 PROCEDURE — 36415 COLL VENOUS BLD VENIPUNCTURE: CPT | Performed by: FAMILY MEDICINE

## 2024-08-04 PROCEDURE — 999N000157 HC STATISTIC RCP TIME EA 10 MIN

## 2024-08-04 PROCEDURE — 94640 AIRWAY INHALATION TREATMENT: CPT

## 2024-08-04 PROCEDURE — 250N000011 HC RX IP 250 OP 636: Performed by: FAMILY MEDICINE

## 2024-08-04 PROCEDURE — 94642 AEROSOL INHALATION TREATMENT: CPT

## 2024-08-04 PROCEDURE — 83605 ASSAY OF LACTIC ACID: CPT | Performed by: FAMILY MEDICINE

## 2024-08-04 PROCEDURE — 999N000208 ECHOCARDIOGRAM COMPLETE

## 2024-08-04 PROCEDURE — 80053 COMPREHEN METABOLIC PANEL: CPT

## 2024-08-04 RX ORDER — IPRATROPIUM BROMIDE AND ALBUTEROL SULFATE 2.5; .5 MG/3ML; MG/3ML
3 SOLUTION RESPIRATORY (INHALATION) EVERY 4 HOURS PRN
Status: DISCONTINUED | OUTPATIENT
Start: 2024-08-04 | End: 2024-08-07 | Stop reason: HOSPADM

## 2024-08-04 RX ADMIN — SODIUM CHLORIDE, POTASSIUM CHLORIDE, SODIUM LACTATE AND CALCIUM CHLORIDE: 600; 310; 30; 20 INJECTION, SOLUTION INTRAVENOUS at 08:02

## 2024-08-04 RX ADMIN — ENOXAPARIN SODIUM 40 MG: 40 INJECTION SUBCUTANEOUS at 09:26

## 2024-08-04 RX ADMIN — KETOROLAC TROMETHAMINE 15 MG: 15 INJECTION, SOLUTION INTRAMUSCULAR; INTRAVENOUS at 18:41

## 2024-08-04 RX ADMIN — PIPERACILLIN AND TAZOBACTAM 4.5 G: 4; .5 INJECTION, POWDER, LYOPHILIZED, FOR SOLUTION INTRAVENOUS at 00:00

## 2024-08-04 RX ADMIN — KETOROLAC TROMETHAMINE 15 MG: 15 INJECTION, SOLUTION INTRAMUSCULAR; INTRAVENOUS at 12:14

## 2024-08-04 RX ADMIN — HYDROMORPHONE HYDROCHLORIDE 0.2 MG: 0.2 INJECTION, SOLUTION INTRAMUSCULAR; INTRAVENOUS; SUBCUTANEOUS at 17:40

## 2024-08-04 RX ADMIN — PIPERACILLIN AND TAZOBACTAM 4.5 G: 4; .5 INJECTION, POWDER, LYOPHILIZED, FOR SOLUTION INTRAVENOUS at 17:37

## 2024-08-04 RX ADMIN — ACETAMINOPHEN 975 MG: 325 TABLET, FILM COATED ORAL at 14:25

## 2024-08-04 RX ADMIN — HUMAN ALBUMIN MICROSPHERES AND PERFLUTREN 6 ML: 10; .22 INJECTION, SOLUTION INTRAVENOUS at 12:30

## 2024-08-04 RX ADMIN — ACETAMINOPHEN 975 MG: 325 TABLET, FILM COATED ORAL at 06:34

## 2024-08-04 RX ADMIN — LIDOCAINE 1 PATCH: 4 PATCH TOPICAL at 07:57

## 2024-08-04 RX ADMIN — ALBUTEROL SULFATE 2.5 MG: 2.5 SOLUTION RESPIRATORY (INHALATION) at 21:34

## 2024-08-04 RX ADMIN — PIPERACILLIN AND TAZOBACTAM 4.5 G: 4; .5 INJECTION, POWDER, LYOPHILIZED, FOR SOLUTION INTRAVENOUS at 06:35

## 2024-08-04 RX ADMIN — OXYCODONE HYDROCHLORIDE 5 MG: 5 TABLET ORAL at 20:17

## 2024-08-04 RX ADMIN — KETOROLAC TROMETHAMINE 15 MG: 15 INJECTION, SOLUTION INTRAMUSCULAR; INTRAVENOUS at 06:34

## 2024-08-04 RX ADMIN — FOLIC ACID 1 MG: 1 TABLET ORAL at 07:56

## 2024-08-04 RX ADMIN — IPRATROPIUM BROMIDE AND ALBUTEROL SULFATE 3 ML: .5; 3 SOLUTION RESPIRATORY (INHALATION) at 04:22

## 2024-08-04 RX ADMIN — THIAMINE HCL TAB 100 MG 100 MG: 100 TAB at 07:56

## 2024-08-04 RX ADMIN — GUAIFENESIN AND DEXTROMETHORPHAN 10 ML: 100; 10 SYRUP ORAL at 20:16

## 2024-08-04 RX ADMIN — ACETAMINOPHEN 975 MG: 325 TABLET, FILM COATED ORAL at 23:00

## 2024-08-04 RX ADMIN — ALBUTEROL SULFATE 2.5 MG: 2.5 SOLUTION RESPIRATORY (INHALATION) at 08:25

## 2024-08-04 RX ADMIN — PIPERACILLIN AND TAZOBACTAM 4.5 G: 4; .5 INJECTION, POWDER, LYOPHILIZED, FOR SOLUTION INTRAVENOUS at 23:07

## 2024-08-04 RX ADMIN — ALBUTEROL SULFATE 2.5 MG: 2.5 SOLUTION RESPIRATORY (INHALATION) at 13:21

## 2024-08-04 RX ADMIN — PIPERACILLIN AND TAZOBACTAM 4.5 G: 4; .5 INJECTION, POWDER, LYOPHILIZED, FOR SOLUTION INTRAVENOUS at 12:26

## 2024-08-04 ASSESSMENT — ACTIVITIES OF DAILY LIVING (ADL)
ADLS_ACUITY_SCORE: 18
DEPENDENT_IADLS:: INDEPENDENT
ADLS_ACUITY_SCORE: 18

## 2024-08-04 NOTE — CONSULTS
Care Management Initial Consult    General Information  Assessment completed with: Patient,    Type of CM/SW Visit: Initial Assessment    Primary Care Provider verified and updated as needed:     Readmission within the last 30 days: no previous admission in last 30 days      Reason for Consult: discharge planning  Advance Care Planning: Advance Care Planning Reviewed: questions answered          Communication Assessment  Patient's communication style: spoken language (non-English)    Hearing Difficulty or Deaf: no   Wear Glasses or Blind: no    Cognitive  Cognitive/Neuro/Behavioral: WDL                      Living Environment:   People in home: alone     Current living Arrangements: house      Able to return to prior arrangements: yes       Family/Social Support:  Care provided by: self  Provides care for: no one  Marital Status:   None          Description of Support System: Uninvolved    Support Assessment: Lacks adequate physical care, Lacks adequate emotional support, Limited social contact and support, Complicated family dynamics    Current Resources:   Patient receiving home care services: No     Community Resources: Other (see comment) (Food pantry)  Equipment currently used at home: none  Supplies currently used at home: None    Employment/Financial:  Employment Status: employed part-time        Financial Concerns: unable to afford rent/mortgage, unable to afford medication(s)   Referral to Financial Worker: Yes       Does the patient's insurance plan have a 3 day qualifying hospital stay waiver?  No    Lifestyle & Psychosocial Needs:  Social Determinants of Health     Food Insecurity: High Risk (8/4/2024)    Food Insecurity     Within the past 12 months, did you worry that your food would run out before you got money to buy more?: Yes     Within the past 12 months, did the food you bought just not last and you didn t have money to get more?: Yes   Depression: Not on file   Housing Stability: High  Risk (8/4/2024)    Housing Stability     Do you have housing? : Yes     Are you worried about losing your housing?: Yes   Tobacco Use: Not on file   Financial Resource Strain: High Risk (8/4/2024)    Financial Resource Strain     Within the past 12 months, have you or your family members you live with been unable to get utilities (heat, electricity) when it was really needed?: Yes   Alcohol Use: Unknown (8/4/2024)    AUDIT-C     Frequency of Alcohol Consumption: Monthly or less     Average Number of Drinks: Not on file     Frequency of Binge Drinking: Not on file   Transportation Needs: High Risk (8/4/2024)    Transportation Needs     Within the past 12 months, has lack of transportation kept you from medical appointments, getting your medicines, non-medical meetings or appointments, work, or from getting things that you need?: Yes   Physical Activity: Not on file   Interpersonal Safety: Low Risk  (8/4/2024)    Interpersonal Safety     Do you feel physically and emotionally safe where you currently live?: Yes     Within the past 12 months, have you been hit, slapped, kicked or otherwise physically hurt by someone?: Not on file     Within the past 12 months, have you been humiliated or emotionally abused in other ways by your partner or ex-partner?: Not on file   Stress: Stress Concern Present (8/4/2024)    [employer] - Occupational Stress Questionnaire     Feeling of Stress : Very much   Social Connections: Not on file   Health Literacy: Not on file       Functional Status:  Prior to admission patient needed assistance:   Dependent ADLs:: Independent  Dependent IADLs:: Independent  Assesssment of Functional Status: Not at baseline with ADL Functioning    Mental Health Status:  Mental Health Status: No Current Concerns       Chemical Dependency Status:  Chemical Dependency Status: No Current Concerns             Values/Beliefs:  Spiritual, Cultural Beliefs, Buddhism Practices, Values  "that affect care: yes  Description of Beliefs that Will Affect Care: Carlos            Additional Information:  Pt is currently on droplet precautions, EMRE called pt room with a Polish interpretor. Pt was agreeable to meet with SW over the phone. Writer met with pt in order to complete initial assessment. Pt is currently residing at a house that  pt rents a room in . Pt was receiving services prior to admission. Pt was going to a local [employer] for meals. Pt reported he is unable to afford his medications and his rent. Pt reports he spoke with his landlord and explained he is in the hospital, landlord was somewhat understanding of the pt being unable to pay his rent this month. Pt spends money back to family is [facility] once a month. Pt is  and has 3 children that live with his ex wife in [facility]. Pt children ages are 17, 15, and 4 years old. Pt is currently self employed. Pt reports due to him being sick he can only work 1-3 days a week right now. 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 Pt reports he doesn't have reliable transportation, he rely's on public transportation or friends to give him rides. Pt reports he sometimes misses the free meals at the [employer] because he doesn't have a ride.     At baseline, pt IS independent with ADLs. Writer confirmed pt contact information and housing was accurate as of this admission. Patient doesn't have a HCD. Pt was informed of the risks of not having an HCD. Pt was also informed of the NOK process and who the designated person would be if the pt became unable to make their own decisions.  Writer reviewed insurance information on file and confirmed this is accurate in the chart. Pt doesn't have insurance, financial services is aware of this and is following this pt. Pt would like for social work to follow up tomorrow on financial resources he may qualify for.      Pt would like his cousin Kulwinder " Kriss who lives in [place] be listed as his contact. Kulwinder's phone number is [phone]. Writer updated the chart to accurate reflect this.       RADHA Mccullough  8/4/2024       Social Work and Care Management Department       SEARCHABLE in [facility] - search SOCIAL WORK       [place] (0800 - 1630) Saturday and Sunday     Units: 4A Vocera, 4C Vocera, & 4E Vocera        Units: 5A 3560-6793 Vocera, 5A 3572-6877 Vocera , BMT SW 1 BMT SW 2, BMT SW 3 & BMT SW 4  5C Off Service 5401 - 5417  5C Off Service 7801-0465     Units: 6A Vocera & 6B Vocera      Units: 6C Vocera     Units: 7A Vocera & 7B Vocera      Units: 7C Med Surg 7401 thru 7418 and 7C Med Surg 7502 thru 7521      Unit: [place] ED Vocera & [place] Obs Vocera     [facility] [place] ([id number]) Saturday and Sunday      Units: 5 Ortho Vocera, 5 Med Surg Vocera & WB ED Vocera     Units: 6 Med Surg Vocera, 8 Med Surg Vocera, & 10 ICU Vocera      After hours Vocera [facility] [place] and After Hours Vocera [place]     Saturday & Sunday (1630 - 0000)    Mon-Fri ([id number])     [facility] Recognized Holidays  (1697-4196)    Units: ALL   - see above VOCERA links to units and after hours

## 2024-08-04 NOTE — PLAN OF CARE
"Goal Outcome Evaluation:      Plan of Care Reviewed With: patient    Overall Patient Progress: improvingOverall Patient Progress: improving    Outcome Evaluation: Managing pain and IV ABx. Reporting numbness on his R lower back. Possibly caused by history of lidocaine patch placed on the same area. Provider aware.      VS: Blood pressure 123/83, pulse 83, temperature 98  F (36.7  C), temperature source Oral, resp. rate 20, height 1.65 m (5' 4.96\"), weight 93.3 kg (205 lb 11 oz), SpO2 96%.   O2: SpO2>90% on 2Lpm via NC, sats dip upon exertion, and with pain to R lower back c/o chest tightness and SOB, provider notified, neb given with benefit.   Output: Voids spontaneously without difficulty in bathroom   Last BM: 8/4/24   Activity: Independent w/ steady gait.    Skin: Visible skin intact, declined skin check, necrotic L thumb   Pain: Managed with pain medications   CMS: A&O x 4; reports numbness on R lower back.   Dressing: None   Diet: Regular   LDA: R PIV SL; (R) port a cath, de accessed.   Equipment: IV pole, personal belongings   Plan: IV pole, personal belongings.    Additional Info:        "

## 2024-08-04 NOTE — PLAN OF CARE
Goal Outcome Evaluation:      Plan of Care Reviewed With: patient    Overall Patient Progress: improvingOverall Patient Progress: improving    VS: VSS    O2: On 2Lpm via NC, sats dip upon exertion, and with pain to R lower back c/o chest tightness and SOB, provider notified, neb given with benefit   Output: Voids spontaneously without difficulty in BSU   Activity: Independent w/ steady gait   Skin: Visible skin intact, declined skin check, L thumb necrosis   Pain: Managed with pain medications    CMS: Intact, denies numbness/tingling   Dressing: No dressings   Diet: Regular   LDA: (R) PIV SL, (R) port a cath, deaccessed   Equipment: IV pole, personal belongings   Plan: TBD

## 2024-08-04 NOTE — PLAN OF CARE
Goal Outcome Evaluation:      Plan of Care Reviewed With: patient          Outcome Evaluation: discharge plan TBD

## 2024-08-04 NOTE — PROGRESS NOTES
[id number] Shift Summary    Pt in bed most shift. Pt is Pashto speaking and needed translation services to assist with assessment and ordering food. Pt complained of pain 8/10, back pain and stomach pain. managed by PRN IV dilaudid and scheduled IV Toradol. R-PIV patent. Pt currently on RA, with contn pulse ox.     Plan- pending discharge. Continue with POC.

## 2024-08-04 NOTE — PROGRESS NOTES
[facility]    Family Medicine Progress Note - [place]'s Service       Main Plans for Today   - IR consult for thoracentesis per ID request  - Continue Abx and monitoring vital signs     Assessment & Plan   Ramón Cruz is a 39 year old male admitted on 7/31/2024. He has no significant past medical history and is admitted for 2-day history of dyspnea, pleuritic chest pain, fever, cough found to have multifocal PNA with loculated pleural effusion.     Multifocal complicated pneumonia - improving   Acute hypoxic respiratory failure s/t pneumonia - improving   Sepsis (WBC, fever, HR) non-severe, due to unknown organism - resolved   Low lung volumes  Elevated CRP (8/1 = 454)   Presented to ED 7/31 for 2-day cough, dyspnea, and fever, later admitted for multifocal PNA (confirmed by CXR and CT chest), small R loculated pleural effusion, and hypoxia s/t PNA. Currently requiring 2L NC O2. Overnight events include fever 100.4, currently afebrile. WBC improved to 13.1 today (previously 17). Per pt report, improvement in symptoms, main concern is chest tightness in the setting of coughing fits, though denies new acute cardiopulmonary changes. Declined pain meds last night, but requested duoneb tx, reported improvement in breathing. Exam Recent 8/3 CXR revealed stable large R pleural effusion. Echo ordered 8/3 to rule out possible cardiogenic etiologies of pulmonary HTN, however results are still pending. Current Abx includes Zosyn 4.5g IV per ID recs. Given improvement in respiratory sxs, contacted ID about further Abx regimen. ID requesting thoracentesis for fluid analysis to guide these changes. Pulm notified and consult for IR placed. Continue with airway clearance regimen per Pulm (albuterol neb TID + mucinex BID + aerobika TID).     Labs:    - Daily CBC   - Daily CMP   - Creatinine every 3 days    - Platelet count every 3 days     Pain management  - tylenol 975mg Q8H  -  ketorolac 15mg IV Q6H  - oxycodone 2.5mg vs. 5mg Q4H PRN  - dilaudid 0.2mg IV Q2H PRN    Abx:   - Thoracentesis needed for further Abx regimen per ID  - Continue Zosyn 4.5g IV per ID recs   - S/P Vancomycin 1,500mg IV (8/1 - 8/3)  - S/P Ceftriaxone 2g IV + Zithromax 500mg IV (7/31 - 8/1)  - S/P Metronidazole 500mg  IV (8/1)    Consults: ID + Pulmonology + IR     Cortical wedge-shaped hypoattentuation of kidneys  7/31/2024 CT abdomen reveals cortical wedge-shaped linear hypoattenuating regions in the mid to lower poles of kidneys bilaterally concerning for pyelonephritis. No urinary sxs, UA unremarkable, and urine culture negative for growth. Radiology confirmed most likely pyelonephritis, no concern for vascular pathologies at this time.  -  Tx covered by Zosyn Abx as discussed above     Moderate hyponatremia - resolved  Na 137 today. Suspect due to acute illness  - Treatment as above     Hyperbilirubinemia - resolved   H/o elevated LFTs  During April visit to ED,  and AST 58, normal bili. LFTs unremarkable. Bili normalized to 0.7 today. Cholecystectomy ~3 years ago.      Alcohol use  Per patient report drinks 24 beers in 1 day every 2 weeks.  - Thiamine 100mg daily  - Folic acid 1mg daily     Opioid induced constipation prevention  Encouraged pt to use bowel PRNs. Last BM earlier this am, no blood.   - Senna PRN  - Mirilax BID PRN    L thumb wound, suspicious for necrosis  Trauma to the thumb ~1 month ago. Black, necrotic tissue at the tip of the thumb. However, no erythema, tenderness, or discharge and so do not suspect this as source of infection.  - No management necessary as of now     H/o cocaine use  Documentation from April 2024 noting screen positives for cocaine w/ associated confusion. No current use.    Diet: Combination Diet Regular Diet Adult  Fluids: PO  Lines:   DVT Prophylaxis: Pneumatic Compression Devices  Code Status: Full Code    Disposition Plan   Expected discharge:  1-2 days ,  recommended to prior living arrangement once antibiotic plan established and no oxygen requirement .     Entered: Promise Bardales DO 08/04/2024, 9:09 AM    The patient's care was discussed with the Attending Physician, Dr. Martinez, Chief Resident/Fellow, Bedside Nurse, and Patient.    Promise Bardales DO  [facility]  Pager: [phone]  Please see sticky note for cross cover information    ----------------------  Interval History   Reports improvement in breathing and R sided flank pain. States that overnight, he spiked a fever and had some chest tightness due to a coughing fit. Continues to cough up thin phlegm, sometimes white/clear and sometimes green. Reports numbness when palpating an area by the R lower lumbar region. Started last night. Continues to feel some pain on the R flank, but improved. No other numbness, tingling, or changes in sensation of the UE and LE.     Physical Exam   Vital Signs: Temp: 100.3  F (37.9  C) Temp src: Oral BP: (!) 132/91 Pulse: 82   Resp: 18 SpO2: 96 % O2 Device: Nasal cannula Oxygen Delivery: 3 LPM  Weight: 205 lbs 11.03 oz  General Appearance: No acute distress. Sitting on the side of the bed, engages in conversation appropriately  Respiratory: No accessory muscle use, able to converse without gasping for air. Decreased breath sounds in the R mid-lower lobe. Crackles present LLL and wheezing throughout L lung.   Cardiovascular: S1S2, RRR. No murmurs, rubs, or gallops. No bl LE edema.   Musc: No spinal tenderness. Mild tender to palpation of the R flank pain, near the edge of the lower R rib cage.       Data   Recent Labs   Lab 08/04/24  0810 08/03/24  1115 08/03/24  0657 08/02/24  0524 08/02/24  0522   WBC 13.1*  --  17.7* 18.2*  --    HGB 11.7*  --  12.2* 12.9*  --    MCV 90  --  91 91  --      --  358 351  --      --  138  --  133*   POTASSIUM 3.4  --  3.4  --  3.7   CHLORIDE 104  --  106  --  100   CO2 21*  --  22  --  23    BUN 6.7  --  7.9  --  8.8   CR 0.85 0.79 0.82  --  0.90   ANIONGAP 12  --  10  --  10   TALA 8.7*  --  8.4*  --  8.0*   *  --  104*  --  102*   ALBUMIN 2.7*  --  2.7*  --  2.9*   PROTTOTAL 6.7  --  6.2*  --  6.5   BILITOTAL 0.7  --  0.9  --  1.3*   ALKPHOS 117  --  79  --  75   ALT 18  --  14  --  14   AST 17  --  9  --  9       Imaging results reviewed over the past 24 hrs:   No results found for this or any previous visit (from the past 24 hour(s)).

## 2024-08-04 NOTE — PROGRESS NOTES
Pt complaining of chest congestion and he is having productive cough with phlegm. Pt is asking for neb treatments. Per pt nebs have been helping. RT paged.

## 2024-08-05 ENCOUNTER — APPOINTMENT (OUTPATIENT)
Dept: INTERVENTIONAL RADIOLOGY/VASCULAR | Facility: CLINIC | Age: 39
End: 2024-08-05
Payer: MEDICAID

## 2024-08-05 LAB
ALBUMIN SERPL BCG-MCNC: 2.8 G/DL (ref 3.5–5.2)
ALP SERPL-CCNC: 140 U/L (ref 40–150)
ALT SERPL W P-5'-P-CCNC: 22 U/L (ref 0–70)
ANION GAP SERPL CALCULATED.3IONS-SCNC: 11 MMOL/L (ref 7–15)
APPEARANCE FLD: ABNORMAL
AST SERPL W P-5'-P-CCNC: 18 U/L (ref 0–45)
BACTERIA BLD CULT: NO GROWTH
BACTERIA BLD CULT: NO GROWTH
BILIRUB SERPL-MCNC: 0.7 MG/DL
BUN SERPL-MCNC: 6.9 MG/DL (ref 6–20)
C DIFF TOX B STL QL: NEGATIVE
CALCIUM SERPL-MCNC: 8.3 MG/DL (ref 8.8–10.4)
CELL COUNT BODY FLUID SOURCE: ABNORMAL
CHLORIDE SERPL-SCNC: 100 MMOL/L (ref 98–107)
COLOR FLD: YELLOW
CREAT SERPL-MCNC: 0.98 MG/DL (ref 0.67–1.17)
EGFRCR SERPLBLD CKD-EPI 2021: >90 ML/MIN/1.73M2
EOSINOPHIL NFR FLD MANUAL: 11 %
ERYTHROCYTE [DISTWIDTH] IN BLOOD BY AUTOMATED COUNT: 14.6 % (ref 10–15)
GLUCOSE BODY FLUID SOURCE: NORMAL
GLUCOSE FLD-MCNC: <2 MG/DL
GLUCOSE SERPL-MCNC: 101 MG/DL (ref 70–99)
HCO3 SERPL-SCNC: 25 MMOL/L (ref 22–29)
HCT VFR BLD AUTO: 35.1 % (ref 40–53)
HEMOCCULT STL QL: NEGATIVE
HGB BLD-MCNC: 11.8 G/DL (ref 13.3–17.7)
HGB BLD-MCNC: 11.9 G/DL (ref 13.3–17.7)
INR PPP: 1.2 (ref 0.85–1.15)
KOH PREPARATION: NORMAL
KOH PREPARATION: NORMAL
LD BODY BODY FLUID SOURCE: NORMAL
LDH FLD L TO P-CCNC: >2500 U/L
LDH SERPL L TO P-CCNC: 154 U/L (ref 0–250)
LYMPHOCYTES NFR FLD MANUAL: 19 %
MCH RBC QN AUTO: 30.7 PG (ref 26.5–33)
MCHC RBC AUTO-ENTMCNC: 33.9 G/DL (ref 31.5–36.5)
MCV RBC AUTO: 91 FL (ref 78–100)
MONOS+MACROS NFR FLD MANUAL: 7 %
NEUTS BAND NFR FLD MANUAL: 63 %
PH BODY FLUID SOURCE: NORMAL
PH FLD: 6 PH
PLATELET # BLD AUTO: 434 10E3/UL (ref 150–450)
POTASSIUM SERPL-SCNC: 3.7 MMOL/L (ref 3.4–5.3)
PROT FLD-MCNC: 4.8 G/DL
PROT SERPL-MCNC: 6.8 G/DL (ref 6.4–8.3)
PROT SERPL-MCNC: 7 G/DL (ref 6.4–8.3)
PROTEIN BODY FLUID SOURCE: NORMAL
RBC # BLD AUTO: 3.87 10E6/UL (ref 4.4–5.9)
SODIUM SERPL-SCNC: 136 MMOL/L (ref 135–145)
WBC # BLD AUTO: 12.9 10E3/UL (ref 4–11)
WBC # FLD AUTO: ABNORMAL /UL

## 2024-08-05 PROCEDURE — 250N000013 HC RX MED GY IP 250 OP 250 PS 637

## 2024-08-05 PROCEDURE — 250N000009 HC RX 250: Performed by: RADIOLOGY

## 2024-08-05 PROCEDURE — 99232 SBSQ HOSP IP/OBS MODERATE 35: CPT | Mod: GC

## 2024-08-05 PROCEDURE — 82945 GLUCOSE OTHER FLUID: CPT

## 2024-08-05 PROCEDURE — 0W993ZZ DRAINAGE OF RIGHT PLEURAL CAVITY, PERCUTANEOUS APPROACH: ICD-10-PCS | Performed by: RADIOLOGY

## 2024-08-05 PROCEDURE — 36415 COLL VENOUS BLD VENIPUNCTURE: CPT

## 2024-08-05 PROCEDURE — 120N000002 HC R&B MED SURG/OB UMMC

## 2024-08-05 PROCEDURE — 250N000009 HC RX 250

## 2024-08-05 PROCEDURE — 84155 ASSAY OF PROTEIN SERUM: CPT

## 2024-08-05 PROCEDURE — 87493 C DIFF AMPLIFIED PROBE: CPT

## 2024-08-05 PROCEDURE — 83615 LACTATE (LD) (LDH) ENZYME: CPT

## 2024-08-05 PROCEDURE — 250N000011 HC RX IP 250 OP 636

## 2024-08-05 PROCEDURE — 32555 ASPIRATE PLEURA W/ IMAGING: CPT | Mod: RT | Performed by: RADIOLOGY

## 2024-08-05 PROCEDURE — 80053 COMPREHEN METABOLIC PANEL: CPT

## 2024-08-05 PROCEDURE — 87075 CULTR BACTERIA EXCEPT BLOOD: CPT

## 2024-08-05 PROCEDURE — 85014 HEMATOCRIT: CPT

## 2024-08-05 PROCEDURE — 87205 SMEAR GRAM STAIN: CPT

## 2024-08-05 PROCEDURE — 83986 ASSAY PH BODY FLUID NOS: CPT

## 2024-08-05 PROCEDURE — 85018 HEMOGLOBIN: CPT

## 2024-08-05 PROCEDURE — 87210 SMEAR WET MOUNT SALINE/INK: CPT

## 2024-08-05 PROCEDURE — 94640 AIRWAY INHALATION TREATMENT: CPT | Mod: 76

## 2024-08-05 PROCEDURE — 87040 BLOOD CULTURE FOR BACTERIA: CPT

## 2024-08-05 PROCEDURE — 32555 ASPIRATE PLEURA W/ IMAGING: CPT

## 2024-08-05 PROCEDURE — 99233 SBSQ HOSP IP/OBS HIGH 50: CPT | Performed by: INTERNAL MEDICINE

## 2024-08-05 PROCEDURE — 89050 BODY FLUID CELL COUNT: CPT

## 2024-08-05 PROCEDURE — 87102 FUNGUS ISOLATION CULTURE: CPT

## 2024-08-05 PROCEDURE — 250N000011 HC RX IP 250 OP 636: Performed by: FAMILY MEDICINE

## 2024-08-05 PROCEDURE — 999N000157 HC STATISTIC RCP TIME EA 10 MIN

## 2024-08-05 PROCEDURE — 82272 OCCULT BLD FECES 1-3 TESTS: CPT

## 2024-08-05 PROCEDURE — 85610 PROTHROMBIN TIME: CPT

## 2024-08-05 PROCEDURE — 84157 ASSAY OF PROTEIN OTHER: CPT

## 2024-08-05 PROCEDURE — 272N000500 HC NEEDLE CR2: Mod: TEL

## 2024-08-05 RX ORDER — PANTOPRAZOLE SODIUM 40 MG/1
40 TABLET, DELAYED RELEASE ORAL
Status: DISCONTINUED | OUTPATIENT
Start: 2024-08-05 | End: 2024-08-05

## 2024-08-05 RX ORDER — PANTOPRAZOLE SODIUM 40 MG/1
40 TABLET, DELAYED RELEASE ORAL
Status: DISCONTINUED | OUTPATIENT
Start: 2024-08-06 | End: 2024-08-07 | Stop reason: HOSPADM

## 2024-08-05 RX ADMIN — ACETAMINOPHEN 975 MG: 325 TABLET, FILM COATED ORAL at 06:28

## 2024-08-05 RX ADMIN — GUAIFENESIN AND DEXTROMETHORPHAN 10 ML: 100; 10 SYRUP ORAL at 23:05

## 2024-08-05 RX ADMIN — FOLIC ACID 1 MG: 1 TABLET ORAL at 08:46

## 2024-08-05 RX ADMIN — PIPERACILLIN AND TAZOBACTAM 4.5 G: 4; .5 INJECTION, POWDER, LYOPHILIZED, FOR SOLUTION INTRAVENOUS at 17:44

## 2024-08-05 RX ADMIN — OXYCODONE HYDROCHLORIDE 5 MG: 5 TABLET ORAL at 23:05

## 2024-08-05 RX ADMIN — PIPERACILLIN AND TAZOBACTAM 4.5 G: 4; .5 INJECTION, POWDER, LYOPHILIZED, FOR SOLUTION INTRAVENOUS at 06:28

## 2024-08-05 RX ADMIN — PIPERACILLIN AND TAZOBACTAM 4.5 G: 4; .5 INJECTION, POWDER, LYOPHILIZED, FOR SOLUTION INTRAVENOUS at 11:27

## 2024-08-05 RX ADMIN — ACETAMINOPHEN 975 MG: 325 TABLET, FILM COATED ORAL at 14:05

## 2024-08-05 RX ADMIN — ALBUTEROL SULFATE 2.5 MG: 2.5 SOLUTION RESPIRATORY (INHALATION) at 19:49

## 2024-08-05 RX ADMIN — THIAMINE HCL TAB 100 MG 100 MG: 100 TAB at 08:46

## 2024-08-05 RX ADMIN — KETOROLAC TROMETHAMINE 15 MG: 15 INJECTION, SOLUTION INTRAMUSCULAR; INTRAVENOUS at 06:28

## 2024-08-05 RX ADMIN — LIDOCAINE 1 PATCH: 4 PATCH TOPICAL at 08:49

## 2024-08-05 RX ADMIN — PANTOPRAZOLE SODIUM 80 MG: 40 INJECTION, POWDER, FOR SOLUTION INTRAVENOUS at 10:24

## 2024-08-05 RX ADMIN — LIDOCAINE HYDROCHLORIDE 8 ML: 10 INJECTION, SOLUTION EPIDURAL; INFILTRATION; INTRACAUDAL; PERINEURAL at 09:56

## 2024-08-05 RX ADMIN — PANTOPRAZOLE SODIUM 40 MG: 40 TABLET, DELAYED RELEASE ORAL at 08:46

## 2024-08-05 RX ADMIN — ALBUTEROL SULFATE 2.5 MG: 2.5 SOLUTION RESPIRATORY (INHALATION) at 15:24

## 2024-08-05 RX ADMIN — ALBUTEROL SULFATE 2.5 MG: 2.5 SOLUTION RESPIRATORY (INHALATION) at 09:01

## 2024-08-05 RX ADMIN — KETOROLAC TROMETHAMINE 15 MG: 15 INJECTION, SOLUTION INTRAMUSCULAR; INTRAVENOUS at 00:26

## 2024-08-05 RX ADMIN — ACETAMINOPHEN 975 MG: 325 TABLET, FILM COATED ORAL at 22:59

## 2024-08-05 RX ADMIN — PIPERACILLIN AND TAZOBACTAM 4.5 G: 4; .5 INJECTION, POWDER, LYOPHILIZED, FOR SOLUTION INTRAVENOUS at 22:59

## 2024-08-05 ASSESSMENT — ACTIVITIES OF DAILY LIVING (ADL)
ADLS_ACUITY_SCORE: 18

## 2024-08-05 NOTE — PLAN OF CARE
"Goal Outcome Evaluation:      Plan of Care Reviewed With: patient    Overall Patient Progress: improvingOverall Patient Progress: improving    Outcome Evaluation: Discharge plan TBD.      VS: Blood pressure (!) 132/92, pulse 88, temperature 98  F (36.7  C), temperature source Oral, resp. rate 20, height 1.65 m (5' 4.96\"), weight 93.3 kg (205 lb 11 oz), SpO2 94%.   O2: SpO2>90% on RA.    Output: Voids spontaneously without difficulty in bathroom   Last BM: 8/5/24. Loose greenish-brown stools   Activity: Independent w/ steady gait.    Skin: Visible skin intact, declined skin check, necrotic L thumb.   Pain: Managed with pain medications.   CMS: A&O x 4   Dressing: None   Diet: Regular   LDA: R PIV SL; (R) port a cath, de accessed.   Equipment: IV pole, cell phone, personal belongings   Plan: Continue POC.   Additional Info: Occult blood stool sample sent.   Thoracentesis, tolerated well.        "

## 2024-08-05 NOTE — CONSULTS
"    Interventional Radiology  [facility] Consult Note  08/05/24   8:16 AM    Consult Requested: \"Multifocial PNA with R pleural effusion, ID requested thoracentesis for fluid analysis to guide Abx regimen\"    Recommendations/Plan:  -Patient is on IR schedule today for a US-guided diagnostic right thoracentesis.   -Plts are WNL for procedure; INR pending.  -Orders entered for procedure. No NPO required.  -Medications to be held include: none  -Consent will be done prior to procedure.     Case and imaging discussed with IR attending, Dr. Salas. Recommendations were reviewed with requesting team.    This is a 39 year old male with past medical history of multifocal PNA admitted on 7/31/24 with dyspnea, pleuritic chest pain, fever and cough. Pulmonology and ID consulted. Current Abx includes Zosyn 4.5g IV per ID recs. Given improvement in respiratory sxs, medical team contacted ID about further Abx regimen. ID requesting thoracentesis for fluid analysis to guide these changes.     Diagnostic labs entered by: Dr. Promise Bardales    Pertinent Imaging Reviewed: CT chest imaging and recent CXR    Expected date of discharge:  TBD    Vitals:   BP (!) 136/97 (BP Location: Left arm)   Pulse 88   Temp 100  F (37.8  C) (Oral)   Resp 20   Ht 1.65 m (5' 4.96\")   Wt 93.3 kg (205 lb 11 oz)   SpO2 92%   BMI 34.27 kg/m      Pertinent Labs:   Lab Results   Component Value Date    WBC 12.9 (H) 08/05/2024    WBC 13.1 (H) 08/04/2024    WBC 17.7 (H) 08/03/2024     Lab Results   Component Value Date    HGB 11.9 08/05/2024    HGB 11.7 08/04/2024    HGB 12.2 08/03/2024     Lab Results   Component Value Date     08/05/2024     08/04/2024     08/03/2024     No results found for: \"INR\", \"PTT\"  Lab Results   Component Value Date    POTASSIUM 3.7 08/05/2024        COVID-19 Antibody Results, Testing for Immunity           No data to display              COVID-19 PCR Results          7/31/2024    12:49   COVID-19 " PCR Results   SARS CoV2 PCR Negative        Linnette Jordan PA-C  Interventional Radiology

## 2024-08-05 NOTE — PROCEDURES
[facility] Brief Procedure Note    Pre-operative diagnosis: Pneumonia, right pleural effusion   Post-operative diagnosis Same   Procedure: Ultrasound-guided right thoracentesis   Surgeon: Salena Salas MD   Assistants(s): -   Estimated blood loss: None        Findings: Despite appearance on chest x-ray, only small effusion was visible on US that appeared partially loculated.  Ultrasound-guided right thoracentesis performed yielding 130 cc of cloudy yellow fluid.  Only minimal fluid remained on ultrasound.   Complications: None.

## 2024-08-05 NOTE — PLAN OF CARE
Assumed cares from 1900 to 2330.  A/Ox4, VS stable except at 2245 when he became febrile with temperature of 101.6 F and that triggered sepsis protocol. Crosscover informed. Lactic acid ordered and VS monitored q30 for an hour.    CM is [place]an speaking and able to understood a little english. Ambulatory and independent. Denied headache, nausea, dizziness and light-headedness. MOODY noted. Diminished LS on R side of chest and back noted on auscultation. O2sat is above 90% @ RA. Reported constant abdominal pain that worsens with cough and was given with PRN oxycodone with relief. Guaifenesin cough syrup also given that helped to expectorate secretions along with nebulization treatment. Secretions described as thick and green. SL is getting IV Zosyn for PNA. Continue POC.

## 2024-08-05 NOTE — PROGRESS NOTES
[facility]    Family Medicine Progress Note - Randolph's Service       Main Plans for Today   - Thoracentesis today - pending fluid analysis   - Started Protonix 80mg IV in the setting of possible upper GI bleed     Assessment & Plan   Ramón Cruz is a 39 year old male admitted on 7/31/2024. He has no significant past medical history and is admitted for 2-day history of dyspnea, pleuritic chest pain, fever, cough found to have multifocal PNA with loculated pleural effusion.     Multifocal complicated pneumonia - improving   Acute hypoxic respiratory failure s/t pneumonia - improving   Sepsis (WBC, fever, HR) non-severe, due to unknown organism - resolved   Low lung volumes  Elevated CRP (8/1 = 454)   Presented to ED 7/31 for 2-day cough, dyspnea, and fever, later admitted for multifocal PNA (confirmed by CXR and CT chest), small R loculated pleural effusion, and hypoxia s/t PNA. Currently requiring 2L NC O2. Overnight events include fever 100.4, currently afebrile. WBC mildly improved from 13.1 to 12.9 today. Thoracentesis performed by IR 8/5, removing 130cc of cloudy yellow fluid with minimal fluid remaining on US. Fluid from R pleural effusion collected and sent off for analysis per ID recs: Cell count and diff, glucose, protein (serum and pleural fluid), LDH (serum and pleural fluid), pH,  bacterial (aerobic and anaerobic) cultures, fungal cultures, Gram stain and fungal (KOH) smear. Pt reports improvement in SOB shortly after procedure. Currently waiting for results for antibiotic guidance. Continue with airway clearance regimen per Pulm (albuterol neb TID + mucinex BID + aerobika TID).     Labs:    - Daily CBC   - Daily CMP   - Creatinine every 3 days    - Platelet count every 3 days     Pain management  - tylenol 975mg Q8H  - ketorolac 15mg IV Q6H  - oxycodone 2.5mg vs. 5mg Q4H PRN  - dilaudid 0.2mg IV Q2H PRN    Abx:  - Continue Zosyn 4.5g IV per ID recs (8/1  - P)  - S/P Vancomycin 1,500mg IV (8/1 - 8/3)  - S/P Ceftriaxone 2g IV + Zithromax 500mg IV (7/31 - 8/1)  - S/P Metronidazole 500mg  IV (8/1)    Consults: ID + Pulmonology + IR following, appreciate recs     Diarrhea  Blood in stool ??   Per patient history, had 6 episodes of non-bloody diarrhea starting 8/4. Per patient and RN, described as dark brown/green color without evidence of bright red blood or obvious melena. Exam reveals tenderness to palpation of the upper quadrants, though continues to have baseline R flank pain in the setting R pleural effusion. In the setting of possible upper GI bleed, discontinued lovenox and toradol as possible sources of increased bleeding risk. C.diff toxin negative. Fecal occult negative. Hemoglobin stable at 11.9, unchanged from previous. Will order separate repeat hemoglobin lab to monitor trends. Concern for acute upper GI bleed remains low given stable vitals and no evidence of melena, though patient was started on 1 dose Protonix IV 80mg and encouraged to monitor stool changes.    - HOLD Lovenox 40mg subQ Q24H   - HOLD toradol 15mg IV Q6H   - C. Diff negative   - Fecal occult negative    - Repeat hemoglobin lab    - Protonix IV 80mg     Cortical wedge-shaped hypoattentuation of kidneys  7/31/2024 CT abdomen reveals cortical wedge-shaped linear hypoattenuating regions in the mid to lower poles of kidneys bilaterally concerning for pyelonephritis. No urinary sxs, UA unremarkable, and urine culture negative for growth. Radiology confirmed most likely pyelonephritis, no concern for vascular pathologies at this time. No new urinary symptoms.   -  Tx covered by Zosyn Abx as discussed above     Moderate hyponatremia - resolved  Na 137 today. Suspect due to acute illness  - Treatment as above     Hyperbilirubinemia - resolved   H/o elevated LFTs  During April visit to ED,  and AST 58, normal bili. LFTs unremarkable. Bili normalized to 0.7 today. Cholecystectomy ~3 years ago.  "     Alcohol use  Per patient report drinks 24 beers in 1 day every 2 weeks.  - Thiamine 100mg daily  - Folic acid 1mg daily     Opioid induced constipation prevention  Encouraged pt to use bowel PRNs. Last BM earlier this am, no blood.   - Senna PRN  - Mirilax BID PRN    L thumb wound, suspicious for necrosis  Trauma to the thumb ~1 month ago. Black, necrotic tissue at the tip of the thumb. However, no erythema, tenderness, or discharge and so do not suspect this as source of infection.  - No management necessary as of now     H/o cocaine use  Documentation from April 2024 noting screen positives for cocaine w/ associated confusion. No current use.    Diet: Combination Diet Regular Diet Adult  Fluids: PO  Lines: PIV  DVT Prophylaxis: Pneumatic Compression Devices  Code Status: Full Code    Disposition Plan   Expected discharge:  1-2 days , recommended to prior living arrangement once antibiotic plan established and no oxygen requirement .     Entered: Promise Bardales DO 08/05/2024, 12:12 PM    The patient's care was discussed with the Attending Physician, Dr. Martinez, Chief Resident/Fellow, Bedside Nurse, and Patient.    Promise Bardaels DO  [facility]  Pager: [phone]  Please see sticky note for cross cover information    ----------------------  Interval History   Reports worsening of SOB this morning prior to thoracentesis procedure. Denies new chest pain, palpitations, hemoptysis, or leg swelling.    Had 6-7 episodes of non-bloody diarrhea yesterday. Last BM earlier this am. Describes the color as a \"very dark brown\". Upper GI discomfort. No hemoptysis or bright red blood in the stool.     Later in the am during rounds: Pt reports improvement in breathing s/p thoracentesis.     Physical Exam   Vital Signs: Temp: 98  F (36.7  C) Temp src: Oral BP: (!) 132/92 Pulse: 88   Resp: 20 SpO2: 94 % O2 Device: None (Room air) Oxygen Delivery: (P) 3 LPM  Weight: 205 lbs 11.03 " oz  General Appearance: No acute distress. Sitting on the side of the bed, engages in conversation appropriately  Respiratory: No accessory muscle use, able to converse without gasping for air. Diminished breath sounds R mid-lower lobes, improved from yesterday. Mild crackles present LLL and wheezing throughout L lung.   Cardiovascular: S1S2, RRR. No murmurs, rubs, or gallops. No bl LE edema.   Musc: No spinal tenderness. Mild tender to palpation of the R flank pain, near the edge of the lower R rib cage. Mild tenderness to palpation of upper GI (RU and ALEX)      Data   Recent Labs   Lab 24  0538 24  0810 24  1115 24  0657   WBC 12.9* 13.1*  --  17.7*   HGB 11.9* 11.7*  --  12.2*   MCV 91 90  --  91    414  --  358    137  --  138   POTASSIUM 3.7 3.4  --  3.4   CHLORIDE 100 104  --  106   CO2 25 21*  --  22   BUN 6.9 6.7  --  7.9   CR 0.98 0.85 0.79 0.82   ANIONGAP 11 12  --  10   TALA 8.3* 8.7*  --  8.4*   * 107*  --  104*   ALBUMIN 2.8* 2.7*  --  2.7*   PROTTOTAL 6.8 6.7  --  6.2*   BILITOTAL 0.7 0.7  --  0.9   ALKPHOS 140 117  --  79   ALT 22 18  --  14   AST 18 17  --  9       Imaging results reviewed over the past 24 hrs:   Recent Results (from the past 24 hour(s))   Echo Complete   Result Value    LVEF  65-70%    Narrative    [id number]  [id number]  [id number]  [id number]^BORIS^FRANCISCA^PATITO     [facility],[place]  Echocardiography Laboratory  [address] [place] [id number]     Name: WOO PARISI  MRN: [id number]  : 1985  Study Date: 2024 12:10 PM  Age: 39 yrs  Gender: Male  Patient Location: [facility]  Reason For Study: Acute Myocarditis, Chest Pressure  Ordering Physician: FRANCISCA ESCALANTE  Performed By: Hanna Brooks     BSA: 2.0 m2  Height: 64 in  Weight: 205 lb  BP: 128/77 mmHg  ______________________________________________________________________________  Procedure  Complete Portable Echo Adult. Contrast  Optison. Optison (NDC #8996-5186-62)  given intravenously. Patient was given 6 ml mixture of 3 ml Optison and 6 ml  saline. 3 ml wasted.  ______________________________________________________________________________  Interpretation Summary  Global and regional left ventricular function is hyperkinetic with an EF of  65-70%.  The RV is not clearly visualized but the size and function appear to be  normal.  No significant valvular abnormalities present.  IVC diameter <2.1 cm collapsing >50% with sniff suggests a normal RA pressure  of 3 mmHg.  There is no prior study for direct comparison.  ______________________________________________________________________________  Left Ventricle  Global and regional left ventricular function is hyperkinetic with an EF of  65-70%. Left ventricular wall thickness is normal. Left ventricular size is  normal. Left ventricular diastolic function is normal.     Right Ventricle  The RV is not clearly visualized but the size and function appear to be  normal.     Atria  Both atria appear normal.     Mitral Valve  The mitral valve is normal. Trace mitral insufficiency is present.     Aortic Valve  The valve leaflets are not well visualized. Trace aortic insufficiency is  present.     Tricuspid Valve  The valve leaflets are not well visualized. Trace tricuspid insufficiency is  present.     Pulmonic Valve  The valve leaflets are not well visualized. Trace pulmonic insufficiency is  present.     Vessels  The aorta root is normal. The thoracic aorta is normal. IVC diameter <2.1 cm  collapsing >50% with sniff suggests a normal RA pressure of 3 mmHg.     Pericardium  No pericardial effusion is present.     Miscellaneous  No significant valvular abnormalities present.     Compared to Previous Study  There is no prior study for direct comparison.  ______________________________________________________________________________  MMode/2D Measurements & Calculations  IVSd: 0.63 cm  LVIDd: 5.6  cm  LVPWd: 0.75 cm  LV mass(C)d: 137.5 grams  LV mass(C)dI: 69.6 grams/m2  Ao root diam: 3.1 cm  asc Aorta Diam: 3.0 cm  LVOT diam: 2.1 cm  LVOT area: 3.5 cm2  Ao root diam index Ht(cm/m): 1.9  Ao root diam index BSA (cm/m2): 1.6  Asc Ao diam index BSA (cm/m2): 1.5  Asc Ao diam index Ht(cm/m): 1.8  LA Volume (BP): 62.1 ml     LA Volume Index (BP): 31.4 ml/m2  RWT: 0.27  TAPSE: 2.4 cm     Doppler Measurements & Calculations  MV E max david: 104.0 cm/sec  MV A max david: 83.7 cm/sec  MV E/A: 1.2  MV dec time: 0.14 sec  Ao V2 max: 178.0 cm/sec  Ao max P.7 mmHg  Ao V2 mean: 121.0 cm/sec  Ao mean P.0 mmHg  Ao V2 VTI: 29.2 cm  KHANG(I,D): 2.4 cm2  KHANG(V,D): 2.6 cm2  LV V1 max P.1 mmHg  LV V1 max: 133.0 cm/sec  LV V1 VTI: 20.6 cm  SV(LVOT): 71.4 ml  SI(LVOT): 36.1 ml/m2  AV David Ratio (DI): 0.75     KHANG Index (cm2/m2): 1.2  E/E' avg: 10.5  Lateral E/e': 7.6  Medial E/e': 13.3  RV S David: 12.6 cm/sec     ______________________________________________________________________________  Report approved by: Domitila Rivera 2024 01:49 PM

## 2024-08-05 NOTE — IR NOTE
Patient Name: Ramón Cruz  Medical Record Number: [id number]  Today's Date: 8/5/2024    Procedure: Thoracentesis  Proceduralist: Dr. Almanza  Pathology present: No    Procedure Start: 0944  Procedure end: 0956  Sedation medications administered: local lidocaine     Report given to: Xavier EASTMAN  : Seferino    Other Notes: Pt arrived to IR room 2 from [facility]. Consent reviewed. Pt denies any questions or concerns regarding procedure. Pt positioned sitting and monitored per protocol. Pt tolerated procedure without any noted complications. Pt transferred back to [facility].      170 ml pleural fluid removed. Labs sent.

## 2024-08-05 NOTE — PLAN OF CARE
Goal Outcome Evaluation:      Plan of Care Reviewed With: patient    Overall Patient Progress: improvingOverall Patient Progress: improving    VS: VSS   O2: SpO2>90% on 2Lpm via NC, sats dip upon exertion, and with pain to R lower back    Output: Voids spontaneously without difficulty in bathroom   Last BM: 8/5/24   Activity: Independent w/ steady gait.    Skin: Visible skin intact, declined skin check, necrotic L thumb   Pain: Managed with pain medications   CMS: A&O x 4; reports numbness on R lower back.   Dressing: None   Diet: Regular   LDA: R PIV SL; (R) port a cath, de accessed.   Equipment: IV pole, personal belongings   Plan: IV pole, personal belongings.    Additional Info:  Iso: rule out C-Diff - sample sent  Order for occult blood - pending collection

## 2024-08-05 NOTE — PROGRESS NOTES
"     [facility] General ID Service: Follow Up Note    Patient:  Ramón Cruz, Date of birth 1985, Medical record number [id number]  Date of Visit: August 5, 2024         Assessment and Recommendations:     Problem List:  1. Pneumonia, with loculated effusion possible empyema  2. Acute hypoxic respiratory failure  3. Diarrhea    Recommendations:  1. Continue pip-tazo, for pneumonia with possible empyema  2. Planning diagnostic thoracentesis today, will follow cultures and testing  3. Follow up pending blood cultures  4. Repeat blood cultures if new fevers  5. Follow up pending fungal testing: histo and blasto Ag, fungal Ab panel  6. Repeat CRP today, to ascertain trend (I added on for tomorrow)      Addendum 8/5pm: Pleurocentesis performed as planned today, with 4+ WBC (primarily PMNs) and gram stain positive for GPC raising suspicion for empyema. This may partially explain his persistent fevers.  Would continue the antibiotic course with pip-tazo for now as we await further cultures. I don't think we need to restart vancomycin at this time, but would have a lower threshold for restarting empiric vancomycin if ongoing fevers or clinical worsening overnight.      Discussion: 39 year old male with no pertinent PMH who presented on 7/31/24 for 2 day hx of SOB, CP, neck pain, back pain, fever, and dry cough. Symptoms started 3 days prior to admission. On arrival he has low grade fever and was tachycardic.  O2 94% on RA. Had mild leukocytosis to 11.9.  , pro-calcitonin 1.31. Creatinine normal. Alp phos, AST and ALT normal but bilirubin mildly elevated (2). UA without evidence of UTI. CXR suggestive of edema. Abdominal US did notn visualize gallbladder (cholecystectomy?). CT chest showed \"scattered consolidative opacities (R>L) likely representing a combination of multifocal pneumonia and atelectasis. Small loculated pleural effusion\". CT abdomen/pelvis showed \"Cortical wedge-shaped/linear hypoattenuating " "regions predominantly in the mid to lower poles of the kidneys bilaterally, concerning for pyelonephritis\". COVID PCR negative. Blood cultures negative to date. Quantiferon in process. Legionella and Strep pneumo ur antigen negative. Sputum culture with mixed respiratory nell. He was started on azithromycin and ceftriaxone on 7/31.  His white count continued to rise - 18.2 (8/1) - 18.2 (8/2) and he had fever o 8/1. New CXR from 8/1/24 showed \"increased opacities through the right lung and increased size of the small to moderate loculated right pleural effusion\". He is on 3L/min NC today (was on 4L/min on 7/31). He was transitioned to vancomycin and zosyn on 8/1/24. Respiratory panel negative. Beta D glucan, Blastomyces and Histoplasma urine antigen ordered today and in process. MRSA swab negative. Vancomycin was discontinued on 8/2.  Planning on thoracentesis today per IR for further evaluation of loculated effusion.    Recommendations were discussed with the primary team.      ID will continue to follow.  Contact anytime with questions.     Total time spent during this encounter (chart review, documentation, MDM, coordination of care): 50 minutes     Jesse Flores MD  Contact via [email or web address] or zanda Paging/Directory             Interval History:     Fever to 102.1 overnight, repeat blood cultures pending. Planning thoracentesis per IR today. New diarrhea.    Microbiology:  8/5 Blood  Pending   C diff  Neg  8/3 Crypto Ag Neg  8/2 Sputum  Normal nell  RVP  No respiratory pathogens detected   B-d glucan Neg  7/31 HIV  Neg  Quant gold Neg   Blood  NGTD  Urine  NG         Review of Systems:     CONSTITUTIONAL:  See HPI  INTEGUMENTARY/SKIN: NEGATIVE for worrisome rashes, moles or lesions  EYES: Negative for icterus, vision changes or irritation  ENT/MOUTH:  Negative for oral lesions and sore throat  RESPIRATORY: See HPI  CARDIOVASCULAR:  Negative for chest pain, palpitations and  shortness of breath  GASTROINTESTINAL:  " Negative for abdominal pain, nausea, vomiting, diarrhea and constipation  GENITOURINARY:  Negative for dysuria, hematuria, frequency and urgency  MUSCULOSKELETAL: Negative for joint pain, swelling, motion restriction, negative for musculoskeletal pain  NEURO:  Negative for headache, altered mental status, numbness or weakness  PSYCHIATRIC: Negative for changes in mood or affect  HEMATOLOGIC/LYMPHATIC: negative for lymphadenopathy or bleeding  ALLERGIC/IMMUNOLOGIC: Negative for allergic reaction   ENDOCRINE: Negative for temperature intolerance, skin/hair changes         Current Antimicrobials     Pip-tazo: 8/1-    Prior:  Vancomycin (8/2-8/3)  Azithro (7/31-8/1)         Physical Exam:   Ranges for vital signs:  Temp:  [98  F (36.7  C)-102.1  F (38.9  C)] 98  F (36.7  C)  Pulse:  [] 88  Resp:  [18-26] 20  BP: (123-159)/() 136/97  SpO2:  [92 %-96 %] 92 %    Intake/Output Summary (Last 24 hours) at 8/5/2024 0920  Last data filed at 8/4/2024 2100  Gross per 24 hour   Intake 378 ml   Output --   Net 378 ml     Exam:  GENERAL:  On O2 supplementation by NC. Having SOB  HEAD: Normocephalic and atraumatic  ENT:  No hearing impairment  EYES:  Eyes grossly normal to inspection  LUNGS:  Lung sounds decreased on right lower lung  CARDIOVASCULAR:  Regular rate and rhythm, normal S1 S2  ABDOMEN:  Soft, non-tender  EXT: Extremities warm and without edema.  MS: No gross musculoskeletal defects noted, no edema  SKIN:  No acute rashes or suspicious lesions  NEUROLOGIC:  Grossly nonfocal. Mentation intact and speech normal  PSYCHIATRIC: Mood stable, mentation appears normal, affect normal         Laboratory Data:     Creatinine   Date Value Ref Range Status   08/05/2024 0.98 0.67 - 1.17 mg/dL Final   08/04/2024 0.85 0.67 - 1.17 mg/dL Final   08/03/2024 0.79 0.67 - 1.17 mg/dL Final   08/03/2024 0.82 0.67 - 1.17 mg/dL Final   08/02/2024 0.90 0.67 - 1.17 mg/dL Final     WBC Count   Date Value Ref Range Status   08/05/2024 12.9  (H) 4.0 - 11.0 10e3/uL Final   2024 13.1 (H) 4.0 - 11.0 10e3/uL Final   2024 17.7 (H) 4.0 - 11.0 10e3/uL Final   2024 18.2 (H) 4.0 - 11.0 10e3/uL Final   2024 18.2 (H) 4.0 - 11.0 10e3/uL Final     Hemoglobin   Date Value Ref Range Status   2024 11.9 (L) 13.3 - 17.7 g/dL Final     Platelet Count   Date Value Ref Range Status   2024 434 150 - 450 10e3/uL Final     AST   Date Value Ref Range Status   2024 18 0 - 45 U/L Final   2024 17 0 - 45 U/L Final   2024 9 0 - 45 U/L Final   2024 9 0 - 45 U/L Final   2024 8 0 - 45 U/L Final     ALT   Date Value Ref Range Status   2024 22 0 - 70 U/L Final   2024 18 0 - 70 U/L Final   2024 14 0 - 70 U/L Final   2024 14 0 - 70 U/L Final   2024 17 0 - 70 U/L Final     Bilirubin Total   Date Value Ref Range Status   2024 0.7 <=1.2 mg/dL Final   2024 0.7 <=1.2 mg/dL Final   2024 0.9 <=1.2 mg/dL Final   2024 1.3 (H) <=1.2 mg/dL Final   2024 0.9 <=1.2 mg/dL Final     Lab Results   Component Value Date     2024    BUN 6.9 2024    CO2 25 2024     Imagin/3/24 CXR  Stable large right pleural effusion with adjacent compressive  consolidation.    24 CT chest  1. No pulmonary embolism.  2. Scattered consolidative opacities likely representing a combination  of multifocal pneumonia and atelectasis.  3. Small loculated right pleural effusion.    24 CT AP  1.  Cortical wedge-shaped/linear hypoattenuating regions predominantly  in the mid to lower poles of the kidneys bilaterally, concerning for  pyelonephritis.  2.  Consolidation in the right lower lobe. Streaky consolidation in  the left lower lobe. Please see same-day dedicated chest CT for  dictation of thoracic findings.

## 2024-08-05 NOTE — PROGRESS NOTES
"[facility]   BRIEF PROGRESS NOTE    Alerted by RN that pt's vitals triggered sepsis protocol, temp was 101.3 F and lactic acid was ordered per protocol.     SUBJECTIVE  Pt reports that he is doing okay, still feeling fever-ca. Intermittently feels chest tightness, duonebs help. Intermittently uses NC. Says sometimes it feels like he is \"getting too much oxygen.\" Reports that he had 6 episodes of loose, non-bloody diarrhea on 8/4, and they started the evening prior. Denies abdominal pain and dysuria. Also complaining of numbness in mid-right back.     OBJECTIVE:  BP (!) 136/97 (BP Location: Left arm)   Pulse 95   Temp 100.4  F (38  C) (Oral)   Resp 26   Ht 1.65 m (5' 4.96\")   Wt 93.3 kg (205 lb 11 oz)   SpO2 92%   BMI 34.27 kg/m      Exam:  General: Alert and oriented, in no acute distress.  CV: No cyanosis or pallor, warm and well perfused. RRR,   Respiratory: No respiratory distress, no accessory muscle use. Faint rhonchi noted on right lower lobe.       ASSESSMENT/PLAN:    # AHRF 2/2 PNA  # Complicated PNA, loculated R pleural infusion  # Fever, tachycardia  Lactate was 0.9, WNL. No new changes in management. Pt is febrile at 101.3F, but WOB of breathing has not worsened. 2L supp O2 via nasal cannula & duo nebs PRN. Will continue to monitor VS and any new labs overnight. No CXR needed has no new development in lung exam, O2 sat, or WOB.   - administer tylenol   - will monitor temp o/n. If continues to be febrile, will get blood culture and resume IV vancomycin    #Non-bloody diarrhea in the setting of antibiotic use   Pt has been on multiple antibiotics since admission, which can contribute to the new onset diarrhea. Per MAR, has not used senna or miralax since admission. Considering C diff.   - ordered c diff toxin b PCR w/ reflex    Please see daily rounding note for full A/P.  Lety Graves MD  12:46 AM       Addendum  3552 - per RN, when the stool sample was collected, the stool was noted " "to be \"charcoal black.\"  - ordered fecal occult blood test  - also ordered blood culture  "

## 2024-08-06 ENCOUNTER — VIRTUAL VISIT (OUTPATIENT)
Dept: INTERPRETER SERVICES | Facility: CLINIC | Age: 39
End: 2024-08-06
Payer: MEDICAID

## 2024-08-06 LAB
ALBUMIN SERPL BCG-MCNC: 2.8 G/DL (ref 3.5–5.2)
ALP SERPL-CCNC: 166 U/L (ref 40–150)
ALT SERPL W P-5'-P-CCNC: 22 U/L (ref 0–70)
ANION GAP SERPL CALCULATED.3IONS-SCNC: 12 MMOL/L (ref 7–15)
AST SERPL W P-5'-P-CCNC: 19 U/L (ref 0–45)
BACTERIA BLD CULT: NO GROWTH
BILIRUB SERPL-MCNC: 0.7 MG/DL
BUN SERPL-MCNC: 6.3 MG/DL (ref 6–20)
CALCIUM SERPL-MCNC: 8.1 MG/DL (ref 8.8–10.4)
CHLORIDE SERPL-SCNC: 105 MMOL/L (ref 98–107)
CREAT SERPL-MCNC: 0.91 MG/DL (ref 0.67–1.17)
CRP SERPL-MCNC: 223.84 MG/L
EGFRCR SERPLBLD CKD-EPI 2021: >90 ML/MIN/1.73M2
ERYTHROCYTE [DISTWIDTH] IN BLOOD BY AUTOMATED COUNT: 14.6 % (ref 10–15)
GLUCOSE SERPL-MCNC: 99 MG/DL (ref 70–99)
HCO3 SERPL-SCNC: 21 MMOL/L (ref 22–29)
HCT VFR BLD AUTO: 34.6 % (ref 40–53)
HGB BLD-MCNC: 11.7 G/DL (ref 13.3–17.7)
MCH RBC QN AUTO: 30.1 PG (ref 26.5–33)
MCHC RBC AUTO-ENTMCNC: 33.8 G/DL (ref 31.5–36.5)
MCV RBC AUTO: 89 FL (ref 78–100)
PLATELET # BLD AUTO: 537 10E3/UL (ref 150–450)
POTASSIUM SERPL-SCNC: 3.6 MMOL/L (ref 3.4–5.3)
PROT SERPL-MCNC: 7.2 G/DL (ref 6.4–8.3)
RBC # BLD AUTO: 3.89 10E6/UL (ref 4.4–5.9)
SODIUM SERPL-SCNC: 138 MMOL/L (ref 135–145)
WBC # BLD AUTO: 10.3 10E3/UL (ref 4–11)

## 2024-08-06 PROCEDURE — 250N000011 HC RX IP 250 OP 636

## 2024-08-06 PROCEDURE — 86140 C-REACTIVE PROTEIN: CPT | Performed by: INTERNAL MEDICINE

## 2024-08-06 PROCEDURE — 250N000013 HC RX MED GY IP 250 OP 250 PS 637

## 2024-08-06 PROCEDURE — 250N000009 HC RX 250

## 2024-08-06 PROCEDURE — 99233 SBSQ HOSP IP/OBS HIGH 50: CPT | Performed by: INTERNAL MEDICINE

## 2024-08-06 PROCEDURE — 99232 SBSQ HOSP IP/OBS MODERATE 35: CPT | Mod: GC

## 2024-08-06 PROCEDURE — 94640 AIRWAY INHALATION TREATMENT: CPT | Mod: 76

## 2024-08-06 PROCEDURE — 36415 COLL VENOUS BLD VENIPUNCTURE: CPT

## 2024-08-06 PROCEDURE — 85014 HEMATOCRIT: CPT

## 2024-08-06 PROCEDURE — 120N000002 HC R&B MED SURG/OB UMMC

## 2024-08-06 PROCEDURE — 94640 AIRWAY INHALATION TREATMENT: CPT

## 2024-08-06 PROCEDURE — T1013 SIGN LANG/ORAL INTERPRETER: HCPCS | Mod: U4,TEL

## 2024-08-06 PROCEDURE — 82247 BILIRUBIN TOTAL: CPT

## 2024-08-06 PROCEDURE — 999N000157 HC STATISTIC RCP TIME EA 10 MIN

## 2024-08-06 RX ADMIN — LIDOCAINE 1 PATCH: 4 PATCH TOPICAL at 08:13

## 2024-08-06 RX ADMIN — AMOXICILLIN AND CLAVULANATE POTASSIUM 1 TABLET: 875; 125 TABLET, FILM COATED ORAL at 19:42

## 2024-08-06 RX ADMIN — AMOXICILLIN AND CLAVULANATE POTASSIUM 1 TABLET: 875; 125 TABLET, FILM COATED ORAL at 11:34

## 2024-08-06 RX ADMIN — ACETAMINOPHEN 975 MG: 325 TABLET, FILM COATED ORAL at 14:04

## 2024-08-06 RX ADMIN — ALBUTEROL SULFATE 2.5 MG: 2.5 SOLUTION RESPIRATORY (INHALATION) at 08:19

## 2024-08-06 RX ADMIN — PIPERACILLIN AND TAZOBACTAM 4.5 G: 4; .5 INJECTION, POWDER, LYOPHILIZED, FOR SOLUTION INTRAVENOUS at 05:41

## 2024-08-06 RX ADMIN — FOLIC ACID 1 MG: 1 TABLET ORAL at 08:12

## 2024-08-06 RX ADMIN — ACETAMINOPHEN 975 MG: 325 TABLET, FILM COATED ORAL at 05:41

## 2024-08-06 RX ADMIN — GUAIFENESIN AND DEXTROMETHORPHAN 10 ML: 100; 10 SYRUP ORAL at 14:04

## 2024-08-06 RX ADMIN — ALBUTEROL SULFATE 2.5 MG: 2.5 SOLUTION RESPIRATORY (INHALATION) at 20:49

## 2024-08-06 RX ADMIN — THIAMINE HCL TAB 100 MG 100 MG: 100 TAB at 08:12

## 2024-08-06 RX ADMIN — ALBUTEROL SULFATE 2.5 MG: 2.5 SOLUTION RESPIRATORY (INHALATION) at 14:15

## 2024-08-06 RX ADMIN — PANTOPRAZOLE SODIUM 40 MG: 40 TABLET, DELAYED RELEASE ORAL at 08:12

## 2024-08-06 RX ADMIN — ACETAMINOPHEN 975 MG: 325 TABLET, FILM COATED ORAL at 21:46

## 2024-08-06 ASSESSMENT — ACTIVITIES OF DAILY LIVING (ADL)
ADLS_ACUITY_SCORE: 19
ADLS_ACUITY_SCORE: 18
ADLS_ACUITY_SCORE: 19
ADLS_ACUITY_SCORE: 18
ADLS_ACUITY_SCORE: 19
ADLS_ACUITY_SCORE: 18
ADLS_ACUITY_SCORE: 19
ADLS_ACUITY_SCORE: 18
ADLS_ACUITY_SCORE: 19
ADLS_ACUITY_SCORE: 18
ADLS_ACUITY_SCORE: 19

## 2024-08-06 NOTE — CONSULTS
Streptococcus, group A o or Meningococcal, Until 24 hrs after initiation of effective therapy. Droplet can be removed.

## 2024-08-06 NOTE — PLAN OF CARE
"Goal Outcome Evaluation:      Plan of Care Reviewed With: patient    Overall Patient Progress: no changeOverall Patient Progress: no change    VS: BP (!) 152/90   Pulse 91   Temp 99.7  F (37.6  C) (Oral)   Resp 22   Ht 1.65 m (5' 4.96\")   Wt 93.3 kg (205 lb 11 oz)   SpO2 93%   BMI 34.27 kg/m       O2: Stable on RA. Pt is on continuous pulse oxymetry.  SOB with exertion.    Output: Voiding without difficulty.    Last BM: LBM 8/5. Pt is c/o multiple diarrhea today.  Pt stated his stool color is starting to change to brown. No black stool this shift.    Activity: Up independently in room.   Skin: Puncture site on R lateral side of his back and necrotic left thumb.    Pain: Pain managed with prn oxycodone and scheduled tylenol.    Neuro: A&O X4. Denies N/T.    Dressing: L thumb wound covered with dressing and CDI.    Diet: Tolerating regular diet.    LDA: PIV SL into left forearm between abx.    Equipment: IV pole and patient belongings.    Plan: Continue plan of care.    Additional Info:               "

## 2024-08-06 NOTE — PLAN OF CARE
"Goal Outcome Evaluation:      Plan of Care Reviewed With: patient    Overall Patient Progress: improvingOverall Patient Progress: improving       VS: Blood pressure 125/82, pulse 84, temperature 97.5  F (36.4  C), temperature source Oral, resp. rate 22, height 1.65 m (5' 4.96\"), weight 93.3 kg (205 lb 11 oz), SpO2 94%.   O2: Stable on RA. Pt is on continuous pulse oximetry.  SOB with exertion.    Output: Voiding without difficulty in bathroom.    Last BM: 8/6/24   Activity: Up independent in room    Skin: Puncture site on R lateral side of his back and necrotic L thumb.    Pain: Managed with scheduled medications   CMS: A&O X4. Denies N/T.    Dressing: L thumb and R lateral back--CDI   Diet: Regular diet   LDA: PIV SL   Equipment: IV pole and personal belongings.    Plan: Anticipate discharge tomorrow.   Additional Info: Romansh speaking,  services utilized.       "

## 2024-08-06 NOTE — PROGRESS NOTES
"     [facility] ID Service: Follow Up Note    Patient:  Ramón Cruz, Date of birth 1985, Medical record number [id number]  Date of Visit: August 6, 2024         Assessment and Recommendations:     Problem List:  1. Pneumonia, with complicated parapneumonic pleural effusion  2. Acute hypoxic respiratory failure, resolving  3. Diarrhea    Recommendations:  1. Discontinue pip-tazo  2. Start amox-clav (875mg PO BID)   - Will require more prolonged duration given complicated parapneumonic pleural effusion   - Would treat for total of 3 weeks of antibiotics, anticipated end date 8/21  3. Continue to follow pending blood, pleural fluid cultures; will adjust antibiotics if necessary  4. Will follow pending fungal testing (histo/blasto Ag, fungal Ab panel) though less concerned at this point  5. No need for ID follow-up, unless unanticipated problems      Discussion: 39 year old male with no pertinent PMH who presented on 7/31/24 for 2 day hx of SOB, CP, neck pain, back pain, fever, and dry cough. Symptoms started 3 days prior to admission. On arrival he has low grade fever and was tachycardic.  O2 94% on RA. Had mild leukocytosis to 11.9.  , pro-calcitonin 1.31. Creatinine normal. Alp phos, AST and ALT normal but bilirubin mildly elevated (2). UA without evidence of UTI. CXR suggestive of edema. Abdominal US did notn visualize gallbladder (cholecystectomy?). CT chest showed \"scattered consolidative opacities (R>L) likely representing a combination of multifocal pneumonia and atelectasis. Small loculated pleural effusion\". CT abdomen/pelvis showed \"Cortical wedge-shaped/linear hypoattenuating regions predominantly in the mid to lower poles of the kidneys bilaterally, concerning for pyelonephritis\". COVID PCR negative. Blood cultures negative to date. Quantiferon in process. Legionella and Strep pneumo ur antigen negative. Sputum culture with mixed respiratory nell. He was started on azithromycin and " "ceftriaxone on 7/31.  His white count continued to rise - 18.2 (8/1) - 18.2 (8/2) and he had fever o 8/1. New CXR from 8/1/24 showed \"increased opacities through the right lung and increased size of the small to moderate loculated right pleural effusion\".  He was transitioned to vancomycin and zosyn on 8/1/24. Respiratory panel negative. Beta D glucan, Blastomyces and Histoplasma urine antigen ordered today and in process. MRSA swab negative. Vancomycin was discontinued on 8/2. Thoracentesis per IR on 8/5 was exudative with positive GPC on gram stains, cultures still pending.  His overall fever curve has been improving with decreasing O2 needs (off supplemental O2 this morning). I think we can switch from pip-tazo to an oral regimen at this point, and amox-clav would be an excellent choice for this. Given complicated parapneumonic pleural effusion, will need a more prolonged course of antibiotics (~3 weeks).      Recommendations were discussed with the primary team.      ID will sign off of the case at this point given a finalized antibiotic plan.  However, I will continue to follow pending cultures/tests, and will reach out to team if there is a need to change antibiotic regimen. Contact anytime with question, concerns or if changes in status.     Total time spent during this encounter (chart review, documentation, MDM, coordination of care): 50 minutes     Jesse Flores MD  Contact via [employer] or [employer] Paging/Directory         Interval History:     Tm in last 24 hrs 99.7, fever curve overall improving.  US-guided R thoracentesis yesterday yielding 130 cc of cloudy yellow fluid. Only minimal fluid remained on ultrasound. Exudative effusion with GPC on GS. Pleural fluid cultures pending. CRP downtrending.    Microbiology:  8/5 Pleural fluid Pending, GPC on GS, WBC 158K (63% PMNs)  Blood  Pending   C diff  Neg  8/3 Histo Ag Pending  Blasto Ag Pending  Crypto Ag Neg  8/2 Sputum  Normal nell  RVP  No respiratory " pathogens detected   B-d glucan Neg  7/31 HIV  Neg  Quant gold Neg   Blood  NGTD  Urine  NG         Review of Systems:     CONSTITUTIONAL:  See HPI  INTEGUMENTARY/SKIN: NEGATIVE for worrisome rashes, moles or lesions  EYES: Negative for icterus, vision changes or irritation  ENT/MOUTH:  Negative for oral lesions and sore throat  RESPIRATORY: See HPI  CARDIOVASCULAR:  Negative for chest pain, palpitations and  shortness of breath  GASTROINTESTINAL:  Negative for abdominal pain, nausea, vomiting, diarrhea and constipation  GENITOURINARY:  Negative for dysuria, hematuria, frequency and urgency  MUSCULOSKELETAL: Negative for joint pain, swelling, motion restriction, negative for musculoskeletal pain  NEURO:  Negative for headache, altered mental status, numbness or weakness  PSYCHIATRIC: Negative for changes in mood or affect  HEMATOLOGIC/LYMPHATIC: negative for lymphadenopathy or bleeding  ALLERGIC/IMMUNOLOGIC: Negative for allergic reaction   ENDOCRINE: Negative for temperature intolerance, skin/hair changes         Current Antimicrobials     Pip-tazo: 8/1-    Prior:  Vancomycin (8/2-8/3)  Azithro (7/31-8/1)         Physical Exam:   Ranges for vital signs:  Temp:  [97.5  F (36.4  C)-99.7  F (37.6  C)] 97.5  F (36.4  C)  Pulse:  [74-91] 84  Resp:  [20-22] 22  BP: (125-152)/(82-98) 125/82  SpO2:  [79 %-94 %] 94 %    Intake/Output Summary (Last 24 hours) at 8/5/2024 0920  Last data filed at 8/4/2024 2100  Gross per 24 hour   Intake 378 ml   Output --   Net 378 ml     Exam:  GENERAL:  On O2 supplementation by NC. Having SOB  HEAD: Normocephalic and atraumatic  ENT:  No hearing impairment  EYES:  Eyes grossly normal to inspection  LUNGS:  Lung sounds decreased on right lower lung  CARDIOVASCULAR:  Regular rate and rhythm, normal S1 S2  ABDOMEN:  Soft, non-tender  EXT: Extremities warm and without edema.  MS: No gross musculoskeletal defects noted, no edema  SKIN:  No acute rashes or suspicious lesions  NEUROLOGIC:  Grossly  nonfocal. Mentation intact and speech normal  PSYCHIATRIC: Mood stable, mentation appears normal, affect normal         Laboratory Data:     Creatinine   Date Value Ref Range Status   2024 0.91 0.67 - 1.17 mg/dL Final   2024 0.98 0.67 - 1.17 mg/dL Final   2024 0.85 0.67 - 1.17 mg/dL Final   2024 0.79 0.67 - 1.17 mg/dL Final   2024 0.82 0.67 - 1.17 mg/dL Final     WBC Count   Date Value Ref Range Status   2024 10.3 4.0 - 11.0 10e3/uL Final   2024 12.9 (H) 4.0 - 11.0 10e3/uL Final   2024 13.1 (H) 4.0 - 11.0 10e3/uL Final   2024 17.7 (H) 4.0 - 11.0 10e3/uL Final   2024 18.2 (H) 4.0 - 11.0 10e3/uL Final     Hemoglobin   Date Value Ref Range Status   2024 11.7 (L) 13.3 - 17.7 g/dL Final     Platelet Count   Date Value Ref Range Status   2024 537 (H) 150 - 450 10e3/uL Final     AST   Date Value Ref Range Status   2024 19 0 - 45 U/L Final   2024 18 0 - 45 U/L Final   2024 17 0 - 45 U/L Final   2024 9 0 - 45 U/L Final   2024 9 0 - 45 U/L Final     ALT   Date Value Ref Range Status   2024 22 0 - 70 U/L Final   2024 22 0 - 70 U/L Final   2024 18 0 - 70 U/L Final   2024 14 0 - 70 U/L Final   2024 14 0 - 70 U/L Final     Bilirubin Total   Date Value Ref Range Status   2024 0.7 <=1.2 mg/dL Final   2024 0.7 <=1.2 mg/dL Final   2024 0.7 <=1.2 mg/dL Final   2024 0.9 <=1.2 mg/dL Final   2024 1.3 (H) <=1.2 mg/dL Final     Lab Results   Component Value Date     2024    BUN 6.3 2024    CO2 21 (L) 2024     Imagin/3/24 CXR  Stable large right pleural effusion with adjacent compressive  consolidation.    24 CT chest  1. No pulmonary embolism.  2. Scattered consolidative opacities likely representing a combination  of multifocal pneumonia and atelectasis.  3. Small loculated right pleural effusion.    24 CT AP  1.  Cortical  wedge-shaped/linear hypoattenuating regions predominantly  in the mid to lower poles of the kidneys bilaterally, concerning for  pyelonephritis.  2.  Consolidation in the right lower lobe. Streaky consolidation in  the left lower lobe. Please see same-day dedicated chest CT for  dictation of thoracic findings.

## 2024-08-06 NOTE — PROGRESS NOTES
[facility]    Family Medicine Progress Note - [facility]       Main Plans for Today   - Bacterial and fungal cultures pending  - Switch from IV Zosyn to oral Augmentin 875-125mg BID, end date 8/21  - Remove droplet precautions    Assessment & Plan   Ramón Cruz is a 39 year old male admitted on 7/31/2024. He has no significant past medical history and is admitted for 2-day history of dyspnea, pleuritic chest pain, fever, cough found to have multifocal PNA with loculated pleural effusion.     Multifocal complicated pneumonia - improving   Acute hypoxic respiratory failure s/t pneumonia - improving   Sepsis (WBC, fever, HR) non-severe, due to unknown organism - resolved   Low lung volumes  Elevated CRP (8/1 = 454)   Presented to ED 7/31 for 2-day cough, dyspnea, and fever, later admitted for multifocal PNA (confirmed by CXR and CT chest), small R loculated pleural effusion, and hypoxia s/t PNA. No acute events overnight. WBC improved from 12.9 8/5 to 10.3 8/6. Patient is s/p thoracentesis 8/5 with 130cc cloudy yellow fluid removed and reports continued improvement in his breathing. Anaerobic bacterial and fungal culture pending. CRP downtrending from 454 8/1 to 223 8/6. Discussed with ID that he may be transitioned to oral antibiotics and given aerobic bacterial culture positive for gram positive cocci. Discontinued IV Zosyn and started Augmentin 875-125mg 1 tab daily. Anticipate the patient staying at least one more night to monitor vitals with this Abx change. Continue with airway clearance regimen per Pulm (albuterol neb TID + mucinex BID + aerobika TID). Pulm recommended outpatient mucinex and aerobika.   Labs:    - Daily CBC   - Daily CMP   - Platelet count every 3 days     Pain management  - tylenol 975mg Q8H  - ketorolac 15mg IV Q6H  - oxycodone 2.5mg vs. 5mg Q4H PRN  - dilaudid 0.2mg IV Q2H PRN    Abx:   - Augmentin 875-125mg BID PO for 3 week course,  (8/6- ending 8/21 )  - S/P Zosyn 4.5g IV per ID recs (8/1 - 8/6)  - S/P Vancomycin 1,500mg IV (8/1 - 8/3)  - S/P Ceftriaxone 2g IV + Zithromax 500mg IV (7/31 - 8/1)  - S/P Metronidazole 500mg  IV (8/1)    Consults: ID + Pulmonology + IR following, appreciate recs     Diarrhea - improving   Blood in stool - none   Per patient history, had 6 episodes of non-bloody diarrhea starting 8/4. Per patient and RN, described as dark brown/green color without evidence of bright red blood or obvious melena. On 8/6, had one episode of non-bloody diarrhea and improving abdominal pain. Abdomen non-tender to palpation of all 4 quadrants. C diff negative, fecal occult negative, hemoglobin consistently stable around 11.9. Concern for upper GI bleed remains low on the differential. Pt was discontinued off Lovenox and toradol on 8/5. Encouraged patient to continue monitoring stool changes.   - Senna PRN  - Mirilax BID PRN    Cortical wedge-shaped hypoattentuation of kidneys  7/31/2024 CT abdomen reveals cortical wedge-shaped linear hypoattenuating regions in the mid to lower poles of kidneys bilaterally concerning for pyelonephritis. No urinary sxs, UA unremarkable, and urine culture negative for growth. Radiology confirmed most likely pyelonephritis, no concern for vascular pathologies at this time. No new urinary symptoms. Suspect   -  Tx covered by Zosyn Abx as discussed above     Thrombocytosis   During this admission, platelets stable 350 - 400s. Platelets increased from 434 to 537 on 8/6, likely reactive thrombocytosis in the setting of possibly worsening PNA, though unlikely given improved clinical progression. Next day 8/7 CBC draw will reveal further platelet trends.     L thumb wound, suspicious for necrosis  Trauma to the thumb ~1 month ago. Black, necrotic tissue at the tip of the thumb present for most of this admission. On 8/6 there was no more black necrotic tissue, exposing the underlying, well-healing skin underneath.  Unclear if fell off on its own or patient removed. No erythema, tenderness, or discharge.  - No management necessary as of now  - Watch for signs of acute changes (erythema, swelling, drainage, bleeding, etc)     Moderate hyponatremia - resolved  Na 137 today. Suspect due to acute illness  - Treatment as above     Hyperbilirubinemia - resolved   H/o elevated LFTs  During April visit to ED,  and AST 58, normal bili. LFTs unremarkable. Bili normalized to 0.7 today. Cholecystectomy ~3 years ago.      Alcohol use  Per patient report drinks 24 beers in 1 day every 2 weeks.  - Thiamine 100mg daily  - Folic acid 1mg daily     H/o cocaine use  Documentation from April 2024 noting screen positives for cocaine w/ associated confusion. No current use.    Diet: Combination Diet Regular Diet Adult  Fluids: PO  Lines: PIV  DVT Prophylaxis: Pneumatic Compression Devices  Code Status: Full Code    Disposition Plan   Expected discharge:  1 days , recommended to prior living arrangement once antibiotic plan established and no oxygen requirement .     Entered: Promise Bardales DO 08/06/2024, 3:29 PM    The patient's care was discussed with the Attending Physician, Dr. Flanagan, Chief Resident/Fellow, Bedside Nurse, and Patient.    Promise Bardales DO  [facility]  Pager: [phone]  Please see sticky note for cross cover information    ----------------------  Interval History     Reports improvement in breathing today. R flank pain improved. Denies CP, palpitations, new SOB, or leg swelling.     Abdominal pain improved. Had 1 episode of non-bloody diarrhea (without melena) this am. Denies CP, palpitations, new SOB, or leg swelling.     Physical Exam   Vital Signs: Temp: 97.5  F (36.4  C) Temp src: Oral BP: 125/82 Pulse: 84   Resp: 22 SpO2: 94 % O2 Device: None (Room air) Oxygen Delivery: 1 LPM  Weight: 205 lbs 11.03 oz  General Appearance: No acute distress. Ambulates around the room  without difficulty. Engages in conversation appropriately.   Respiratory: No respiratory distress. Improving breath sounds R mid-lower lobes. Improving crackles of LLL.   Cardiovascular: S1S2. RRR. No murmurs, rubs, or gallops  GI: Normal bowel sounds in 4x quadrants. Nontender to palpation of all quads.     Data   Recent Labs   Lab 08/06/24  0700 08/05/24  1208 08/05/24  0538 08/04/24  0810   WBC 10.3  --  12.9* 13.1*   HGB 11.7* 11.8* 11.9* 11.7*   MCV 89  --  91 90   *  --  434 414   INR  --  1.20*  --   --      --  136 137   POTASSIUM 3.6  --  3.7 3.4   CHLORIDE 105  --  100 104   CO2 21*  --  25 21*   BUN 6.3  --  6.9 6.7   CR 0.91  --  0.98 0.85   ANIONGAP 12  --  11 12   TALA 8.1*  --  8.3* 8.7*   GLC 99  --  101* 107*   ALBUMIN 2.8*  --  2.8* 2.7*   PROTTOTAL 7.2 7.0 6.8 6.7   BILITOTAL 0.7  --  0.7 0.7   ALKPHOS 166*  --  140 117   ALT 22  --  22 18   AST 19  --  18 17       Imaging results reviewed over the past 24 hrs:   No results found for this or any previous visit (from the past 24 hour(s)).

## 2024-08-06 NOTE — PLAN OF CARE
Goal Outcome Evaluation:  Plan of Care Reviewed With: patient  Overall Patient Progress: no change    Desat O/N, placed on 1LPM. Currently ORA, on cont pulse ox. MOODY; productive cough. Denied pain, on scheduled Tylenol. L thumb wound. Tolerating intake. Up IND to BR. No BM overnight. PIV SL between ABX. Yi speaking,  services utilized.     For vital signs and complete assessments, please see documentation flowsheets.

## 2024-08-06 NOTE — DISCHARGE SUMMARY
"[facility]  Discharge Summary - Medicine & Pediatrics       Date of Admission:  7/31/2024  Date of Discharge:  8/7/2024    Discharging Provider: Juan Antonio Flanagan DO  Discharge Service: [facility] Medicine Service    Discharge Diagnoses   Multifocal complicated pneumonia, improving   Acute hypoxic respiratory failure s/t pneumonia, resolved  Sepsis (WBC, fever, HR) non-severe, due to unknown organism, resolved   Low lung volumes  Elevated CRP, improved  Diarrhea  Cortical wedge-shaped hypoattentuation of kidneys  Pyelonephritis  Thrombocytosis  L thumb wound  suspicious for necrosis  Hyperbilirubinemia  H/o elevated LFTs  Hyponatremia  Alcohol use  History Cocaine Use one time    Clinically Significant Risk Factors     # Obesity: Estimated body mass index is 34.27 kg/m  as calculated from the following:    Height as of this encounter: 1.65 m (5' 4.96\").    Weight as of this encounter: 93.3 kg (205 lb 11 oz).       Follow-ups Needed After Discharge   - Hospital follow-up with PCP  - Augmentin 875-125mg PO 1 tab daily for 3 week course   - Pulmonology follow-up on 8/20/2024 with Dr. Aly  - PCP follow up in 1-2 weeks (scheduled for 8/23, Dr. Goncalves [facility])    Unresulted Labs Ordered in the Past 30 Days of this Admission       Date and Time Order Name Status Description    8/5/2024 11:42 AM Fungal or Yeast Culture Routine Preliminary     8/5/2024  8:34 AM Anaerobic bacterial culture Preliminary     8/5/2024  8:34 AM Pleural fluid Aerobic Bacterial Culture Routine With Gram Stain Preliminary     8/5/2024  5:46 AM Blood Culture Hand, Left Preliminary         These results will be followed up by PCP, ID and Pulm    Discharge Disposition   Discharged to home  Condition at discharge: Stable    Hospital Course   Ramón Cruz was admitted on 7/31/2024 for 2-day history of dyspnea, pleuritic chest pain, fever, cough found to have multifocal PNA with loculated pleural " effusion. The following problems were addressed during his hospitalization:    Multifocal complicated pneumonia, improving   Acute hypoxic respiratory failure s/t pneumonia, resolved  Sepsis (WBC, fever, HR) non-severe, due to unknown organism, resolved   Low lung volumes  Elevated CRP, improved  Presented to the ED on 7/31 for 2-day history of cough, dyspnea, and fever, later admitted for hypoxia s/t PNA. CT chest and CXR revealed multifocal PNA and small R loculated pleural effusion. On admission, oxygen requirement was 4-6L. Workup revealed leukocytosis of 11.9 on admission, that remained elevated for most of this admission. WBC normalized on 8/6 to 10.3, increased somewhat to 14 on 8/7/24 but was considered unlikely to be worsening infection given clinical improvement and afebrile status. Respiratory panel, TB, HIV, Legionella urinary antigen, and crypto was negative. Infectious Disease and Pulmonology were consulted. Antibiotic regimen started out with Ceftriaxone 2g IV + Zithromax 500mg IV (7/31 - 8/1), later adding on Metronidazole given suspicion of aspiration PNA due to alcohol use and/or possible Legionella. These Abx were later discontinued and patient was started on Zosyn 4.5g IV per ID recs (8/1 - 8/6) along with Vancomycin 1,500mg IV (8/1 - 8/3). During this admission, sepsis protocol was initiated 2X (on 8/1 and 8/2) given fever, chest tightness, and increased difficulty with breathing. Sepsis labs were unremarkable, patient's fever managed with tylenol as needed. Patient received thoracentesis on 8/5 for diagnostic and therapeutic purposes, 130cc cloudy yellow pleural fluid was removed and sent out for fluid analysis. Currently, there are several labs still in process (anaerobic bacterial culture and fungal culture) but aerobic bacterial culture came back positive for gram-positive cocci. Given clinical improvement in his sxs, ID recommended to change his Abx from Zosyn to outpatient oral Augmentin  875-125mg 1 tab daily X 3 weeks given prolonged course of antibiotics. ID suspected presence of empyema complicated his evening fever spikes and prolonged recovery. After the thoracentesis, patient reported significant improving in breathing and leukocytosis resolved to 10 on 8/6. Oxygen demands slowly decreased to room air 93%+. Per Pulmonology, will discharge patient with airway clearance regimen: mucinex BID and aerobika TID (neb tx not required per Pulm), for outpatient management. Pulm recommended 6-8 week follow-up for repeat imaging and had follow up arranged with Dr. Aly on 8/20/24 at time of discharge.    - oral Augmentin 875-125mg 1 tab daily X 3 weeks (end date 8/21/2024)   - Mucinex BID + Aerobika TID    - Follow-up: 6-8 weeks with Pulmonology for repeat imaging     Consulted: ID + Pulmonology + IR      Diarrhea, resolved  Prior to ED admission, patient reports having 6-8 episodes of non-bloody watery diarrhea on 7/30. Additionally, had another 6 episodes of non-bloody diarrhea on 8/4. Per pt and RN, stool was described as a dark brown/green color without evidence for bright red blood. Initiated work-up for possible subacute upper GI bleed given down trending Hbg over several days (initiated IV Protonix 80mg every day. Discontinued Lovenox (12.9-> 11.7  between 8/2 and 8/6). However, hemoglobin remained stable and was improved by day of discharge. Patient  was having normal bowel movements on day of discharge.     Cortical wedge-shaped hypoattentuation of kidneys  Pyelonephritis  7/31/2024 CT abdomen reveals cortical wedge-shaped linear hypoattenuating regions in the mid to lower poles of kidneys bilaterally concerning for pyelonephritis. Consulted with Radiology to evaluate other causes of wedge-shaped attenuation and it was confirmed that these imaging findings were most consistent with pyelo, no concern for vascular pathologies. Patient endorsed R flank pain for most of this admission, however,  suspected it was due to the R lower pleural effusion given negative urinary symptoms. Additionally, UA and urine Cx were negative for growth. Antibiotic choices for multifocal PNA as highlighted above covered pyelonephritis.      Thrombocytosis   During this admission, platelets increased from 434 to 537 on 8/6, likely reactive thrombocytosis in the setting of known pneumonia. Was discharged with strict return precautions and plan for PCP follow up.     L thumb wound, suspicious for necrosis  Trauma to the thumb ~1 month ago after sustaining a hammer-injury to the finger. Black, necrotic tissue at the tip of the thumb present for most of this admission. On 8/6 there was no more black necrotic tissue, exposing the underlying, well-healing skin underneath. Unclear if fell off on its own or patient removed.Given no erythema, tenderness, or discharge, concern for SSTI remains low. Consider outpatient management as needed.     Moderate hyponatremia - resolved  Sodium was found to be low at 132 on 8/1 improved to 134 by discharge. No other intervention for hyponatremia given resolution. Suspected temporary hyponatremia in the setting of acute infection (both PNA and pyelo).      Hyperbilirubinemia - resolved   H/o elevated LFTs  During April visit to ED,  and AST 58, normal bili. LFTs likely elevated in the setting of chronic alcohol use, low concern for acute hepatobiliary processes. LFTs and bilirubin remained unremarkable for this admission. Cholecystectomy ~3 years ago. No further work-up required.      Alcohol use  Per patient report drinks 24 beers in 1 day every 2 weeks. On admission, no concern for alcohol withdrawal given no alcohol consumption prior to ED visit. No work-up for alcohol withdrawal was required. Gave thiamine 100mg daily and folic acid 1mg daily.     H/o cocaine use  Documentation from April 2024 noting screen positives for cocaine w/ associated confusion. No current use. No further work-up  was required. No concern for cocaine use disorder.    Consultations This Hospital Stay   PHARMACY TO DOSE VANCO  PULMONARY GENERAL ADULT IP CONSULT  INFECTIOUS DISEASE [facility] ADULT IP CONSULT  INTERVENTIONAL RADIOLOGY ADULT/PEDS IP CONSULT  CARE MANAGEMENT / SOCIAL WORK IP CONSULT  INFECTION PREVENTION IP CONSULT    Code Status   Full Code     The patient was discussed with Dr. Juan Antonio Whaley MD  [facility]  [facility]  [address] [address]  Phone: [phone]  Fax: [phone]  ______________________________________________________________________    Physical Exam   Vital Signs: Temp: 98.2  F (36.8  C) Temp src: Oral BP: 121/77 Pulse: 79   Resp: 19 SpO2: 94 % O2 Device: None (Room air)    Weight: 205 lbs 11.03 oz  Physical Exam  General Appearance:  No acute distress. Ambulates around the room without difficulty. Engages in conversation appropriately.   Respiratory: No respiratory distress. Improving breath sound. Crackles right lung (predominant crackles in bases). Clear to auscultation left lung.   Cardiovascular: S1S2. RRR. No murmurs, rubs, or gallops  GI: Normal bowel sounds in 4x quadrants. Nontender to palpation of all quads.    Primary Care Physician   Physician No Ref-Primary    Discharge Orders      CT Chest w/o Contrast     Adult Pulmonary Medicine  Referral      Primary Care Referral      Reason for your hospital stay    You were hospitalized for a severe infection of your lung. You needed antibiotics through the IV, oxygen, and eventually needed a fluid pocket around your lung drained. We tested the fluid from your lung, and found that there was bacteria in it. Once you stopped having fevers and needed less oxygen, we changed from antibiotics through the IV to antibiotics in a pill. You will be sent home to finish these antibiotics.     There was a time during the hospital stay where we were  worried you were having bleeding from your stomach because your stool was black. However, this resolved and we are no longer worried you are having bleeding from your stomach.     Activity    Your activity upon discharge: activity as tolerated     Follow Up and recommended labs and tests    Follow up with the Pulmonology doctors ( Dr. Aly at 2:00pm 8/20/24) at to make sure the infection in your lung and the fluid pocket is improving.     Follow up with a primary care doctor in 1-2 weeks (Dr. Goncalves at 2:40pm on 8/23/2024) at [facility].     Diet    Follow this diet upon discharge: Orders Placed This Encounter      Combination Diet Regular Diet Adult       Significant Results and Procedures   Most Recent 3 CBC's:  Recent Labs   Lab Test 08/07/24  0704 08/06/24  0700 08/05/24  1208 08/05/24  0538   WBC 14.0* 10.3  --  12.9*   HGB 12.5* 11.7* 11.8* 11.9*   MCV 89 89  --  91   * 537*  --  434     Most Recent 3 BMP's:  Recent Labs   Lab Test 08/07/24  0704 08/06/24  0700 08/05/24  0538   * 138 136   POTASSIUM 3.5 3.6 3.7   CHLORIDE 100 105 100   CO2 20* 21* 25   BUN 7.7 6.3 6.9   CR 0.84 0.91 0.98   ANIONGAP 14 12 11   TALA 8.9 8.1* 8.3*   * 99 101*     Most Recent ESR & CRP:  Recent Labs   Lab Test 08/06/24  0700   CRPI 223.84*       Discharge Medications   Current Discharge Medication List        START taking these medications    Details   acetaminophen (TYLENOL) 325 MG tablet Take 2 tablets (650 mg) by mouth every 6 hours as needed for pain or fever  Qty: 180 tablet, Refills: 0    Associated Diagnoses: Pleuritic chest pain      amoxicillin-clavulanate (AUGMENTIN) 875-125 MG tablet Take 1 tablet by mouth every 12 hours for 15 days  Qty: 30 tablet, Refills: 0    Associated Diagnoses: Sepsis, due to unspecified organism, unspecified whether acute organ dysfunction present (H); Pneumonia of right lower lobe due to infectious organism; Parapneumonic effusion           Allergies   No Known  Allergies

## 2024-08-07 VITALS
DIASTOLIC BLOOD PRESSURE: 77 MMHG | OXYGEN SATURATION: 94 % | WEIGHT: 205.69 LBS | HEIGHT: 65 IN | HEART RATE: 79 BPM | TEMPERATURE: 98.2 F | SYSTOLIC BLOOD PRESSURE: 121 MMHG | RESPIRATION RATE: 19 BRPM | BODY MASS INDEX: 34.27 KG/M2

## 2024-08-07 LAB
ALBUMIN SERPL BCG-MCNC: 3.2 G/DL (ref 3.5–5.2)
ALP SERPL-CCNC: 126 U/L (ref 40–150)
ALT SERPL W P-5'-P-CCNC: 29 U/L (ref 0–70)
ANION GAP SERPL CALCULATED.3IONS-SCNC: 14 MMOL/L (ref 7–15)
ASPERGILLUS AB TITR SER CF: NORMAL {TITER}
AST SERPL W P-5'-P-CCNC: 22 U/L (ref 0–45)
B DERMAT AB SER-ACNC: 0.1 IV
BILIRUB SERPL-MCNC: 0.5 MG/DL
BUN SERPL-MCNC: 7.7 MG/DL (ref 6–20)
CALCIUM SERPL-MCNC: 8.9 MG/DL (ref 8.8–10.4)
CHLORIDE SERPL-SCNC: 100 MMOL/L (ref 98–107)
COCCIDIOIDES AB TITR SER CF: NORMAL {TITER}
CREAT SERPL-MCNC: 0.84 MG/DL (ref 0.67–1.17)
EGFRCR SERPLBLD CKD-EPI 2021: >90 ML/MIN/1.73M2
ERYTHROCYTE [DISTWIDTH] IN BLOOD BY AUTOMATED COUNT: 14.6 % (ref 10–15)
GLUCOSE SERPL-MCNC: 102 MG/DL (ref 70–99)
H CAPSUL MYC AB TITR SER CF: NORMAL {TITER}
H CAPSUL YST AB TITR SER CF: NORMAL {TITER}
HCO3 SERPL-SCNC: 20 MMOL/L (ref 22–29)
HCT VFR BLD AUTO: 37.6 % (ref 40–53)
HGB BLD-MCNC: 12.5 G/DL (ref 13.3–17.7)
MCH RBC QN AUTO: 29.6 PG (ref 26.5–33)
MCHC RBC AUTO-ENTMCNC: 33.2 G/DL (ref 31.5–36.5)
MCV RBC AUTO: 89 FL (ref 78–100)
PLATELET # BLD AUTO: 668 10E3/UL (ref 150–450)
POTASSIUM SERPL-SCNC: 3.5 MMOL/L (ref 3.4–5.3)
PROT SERPL-MCNC: 7.9 G/DL (ref 6.4–8.3)
RBC # BLD AUTO: 4.22 10E6/UL (ref 4.4–5.9)
SCANNED LAB RESULT: NORMAL
SODIUM SERPL-SCNC: 134 MMOL/L (ref 135–145)
WBC # BLD AUTO: 14 10E3/UL (ref 4–11)

## 2024-08-07 PROCEDURE — 80053 COMPREHEN METABOLIC PANEL: CPT

## 2024-08-07 PROCEDURE — 85027 COMPLETE CBC AUTOMATED: CPT

## 2024-08-07 PROCEDURE — 250N000013 HC RX MED GY IP 250 OP 250 PS 637

## 2024-08-07 PROCEDURE — 999N000157 HC STATISTIC RCP TIME EA 10 MIN

## 2024-08-07 PROCEDURE — 250N000009 HC RX 250

## 2024-08-07 PROCEDURE — 250N000013 HC RX MED GY IP 250 OP 250 PS 637: Performed by: STUDENT IN AN ORGANIZED HEALTH CARE EDUCATION/TRAINING PROGRAM

## 2024-08-07 PROCEDURE — 94640 AIRWAY INHALATION TREATMENT: CPT | Mod: 76

## 2024-08-07 PROCEDURE — 99239 HOSP IP/OBS DSCHRG MGMT >30: CPT | Mod: GC

## 2024-08-07 PROCEDURE — 94640 AIRWAY INHALATION TREATMENT: CPT

## 2024-08-07 PROCEDURE — 36415 COLL VENOUS BLD VENIPUNCTURE: CPT

## 2024-08-07 RX ORDER — LIDOCAINE 4 G/G
2 PATCH TOPICAL
Status: DISCONTINUED | OUTPATIENT
Start: 2024-08-07 | End: 2024-08-07 | Stop reason: HOSPADM

## 2024-08-07 RX ORDER — ACETAMINOPHEN 325 MG/1
650 TABLET ORAL EVERY 6 HOURS PRN
Qty: 180 TABLET | Refills: 0 | Status: SHIPPED | OUTPATIENT
Start: 2024-08-07

## 2024-08-07 RX ADMIN — THIAMINE HCL TAB 100 MG 100 MG: 100 TAB at 09:33

## 2024-08-07 RX ADMIN — PANTOPRAZOLE SODIUM 40 MG: 40 TABLET, DELAYED RELEASE ORAL at 09:35

## 2024-08-07 RX ADMIN — ACETAMINOPHEN 975 MG: 325 TABLET, FILM COATED ORAL at 13:22

## 2024-08-07 RX ADMIN — AMOXICILLIN AND CLAVULANATE POTASSIUM 1 TABLET: 875; 125 TABLET, FILM COATED ORAL at 09:33

## 2024-08-07 RX ADMIN — ALBUTEROL SULFATE 2.5 MG: 2.5 SOLUTION RESPIRATORY (INHALATION) at 08:45

## 2024-08-07 RX ADMIN — LIDOCAINE 1 PATCH: 4 PATCH TOPICAL at 09:34

## 2024-08-07 RX ADMIN — FOLIC ACID 1 MG: 1 TABLET ORAL at 09:33

## 2024-08-07 RX ADMIN — ALBUTEROL SULFATE 2.5 MG: 2.5 SOLUTION RESPIRATORY (INHALATION) at 14:37

## 2024-08-07 RX ADMIN — Medication 2.5 MG: at 09:33

## 2024-08-07 RX ADMIN — ACETAMINOPHEN 975 MG: 325 TABLET, FILM COATED ORAL at 05:53

## 2024-08-07 ASSESSMENT — ACTIVITIES OF DAILY LIVING (ADL)
ADLS_ACUITY_SCORE: 19

## 2024-08-07 NOTE — PLAN OF CARE
Goal Outcome Evaluation:      Plan of Care Reviewed With: patient    Overall Patient Progress: improving    VS: VSS. Denies CP/SOB. A/O x4.   O2: >90% on RA    Output: Voiding adequate amounts w/o pain or difficulty    Activity: Up ind    Skin: Intact    Pain: R back pain, managing with tylenol. Oxy x1   CMS: Intact    Dressing: Cdi   Diet: Regular, tolerating well.    LDA: PIV removed before discharge   Additional Info:      DISCHARGE SUMMARY    Pt discharging to: Home  Transportation: family, car  AVS given and discussed: Yes, no further questions.   Medications given: Yes, discussed. No further questions.   Belongings returned: Yes, ensured all belongings packed and sent with pt. No items in security.   Comments: Escorted safely to elevators. Used interpretor for all discharge instructions.

## 2024-08-07 NOTE — PLAN OF CARE
"Goal Outcome Evaluation:         VS: Blood pressure 118/73, pulse 84, temperature 98.9  F (37.2  C), temperature source Oral, resp. rate 19, height 1.65 m (5' 4.96\"), weight 93.3 kg (205 lb 11 oz), SpO2 94%.    O2: RA   Output: Continent of bowel and bladder   Last BM: 8/6/24   Activity: Independent   Skin: Thoracentesis incision site on back, L thumb necrosis   Pain: Schedule Tylenol   CMS: Alert and Oriented   Dressing:    Diet: Reg/Thin/Whole   LDA: R PIV   Equipment: Call light, personal belongings   Plan: Cont'd POC   Additional Info: Pt slept through most of the night,no acute changes ON.                     "

## 2024-08-07 NOTE — PROGRESS NOTES
Care Management Discharge Note    Discharge Date: 08/07/2024       Discharge Disposition: Home    Discharge Services: Other (see comment) (financial services)    Discharge DME: None    Discharge Transportation: family or friend will provide, public transportation    Private pay costs discussed: insurance costs EMA Pending     Does the patient's insurance plan have a 3 day qualifying hospital stay waiver?  No    PAS Confirmation Code:  n/a   Patient/family educated on Medicare website which has current facility and service quality ratings: no    Education Provided on the Discharge Plan: No  Persons Notified of Discharge Plans: Pt  Patient/Family in Agreement with the Plan: yes    Handoff Referral Completed: No    Additional Information:  RNCC notified Pt is medically ready to discharge. Pt at this time remains self-pay. RNCC called  and sent a message requesting follow up asap. Current  notes indicate follow up is not planned until 8/9.     RNCC met with Pt at the bedside.  Over the phone interpretor used for visit. RNCC explained his EMA remains pending and a FC is scheduled to follow up with him on Friday. RNCC educated Pt to answer his phone, even unknown #s and to let the MD know at his follow up appointment on 8/23 with Dr. Goncalves if he has not been contacted by FC.    Pt was not aware of this appointment and it was not on the AVS. RNCC confirmed with Mohsen the apt was made and requested bedside RN reprint the AVS.     Pt stated his friend will pick him up and will be available to transport to medical appointments.     No other discharge needs identified at this time.     Margot Howell RN  RNCC Float   [phone]

## 2024-08-07 NOTE — DISCHARGE INSTRUCTIONS
Motivo de rodriguez hospitalización  Lo hospitalizaron por heaven infección grave en el pulmón. Necesitaba antibióticos por vía intravenosa, oxígeno y, finalmente, drenar heaven bolsa de líquido alrededor del pulmón. Analizamos el líquido de rodriguez pulmón y descubrimos que contenía bacterias. Heaven vez que dejó de tener fiebre y necesitó menos oxígeno, cambiamos los antibióticos por vía intravenosa a antibióticos en pastillas. Lo enviarán a rodriguez casa para que termine de vern estos antibióticos.    Hubo un momento dalia la hospitalización en el que nos preocupaba que tuviera sangrado de estómago porque raul heces nakul negras. Sin embargo, esto se resolvió y ya no nos preocupa que tenga sangrado de estómago.       Cuándo debe pedir ayuda?   Llame al 911 en cualquier momento que considere que necesita atención de emergencia. Por ejemplo, llame si:    Tiene serias dificultades para respirar.   Llame a rodriguez médico ahora mismo o busque atención médica inmediata si:    Tose mucosidad (esputo) de color café oscuro o con wyatt.     Tiene nueva o peor dificultad para respirar.     Siente mareos o aturdimiento, o que está a punto de desmayarse.   Preste especial atención a los cambios en rodriguez oj y asegúrese de comunicarse con rodriguez médico si:    Presenta fiebre o la fiebre es más moi.     Rodriguez tos es más profunda o más frecuente que antes.     No está mejorando después de 2 días (48 horas).     No mejora eligio se esperaba.

## 2024-08-07 NOTE — PLAN OF CARE
Goal Outcome Evaluation:           A&Ox4. VSS, schedule pain meds, R PIV, Regular/thin/whole, continent of bowel and bladder, Independent in the room. Possible discharge tomorrow.  No further concern this shift.       For vital signs and complete assessments, please see documentation flowsheets.

## 2024-08-08 ENCOUNTER — PATIENT OUTREACH (OUTPATIENT)
Dept: CARE COORDINATION | Facility: CLINIC | Age: 39
End: 2024-08-08
Payer: MEDICAID

## 2024-08-08 NOTE — PROGRESS NOTES
"[facility]  Transitions of Care Outreach  Chief Complaint   Patient presents with    Clinic Care Coordination - Post Hospital       Most Recent Admission Date: 7/31/2024   Most Recent Admission Diagnosis: Sepsis, due to unspecified organism, unspecified whether acute organ dysfunction present (H) - A41.9     Most Recent Discharge Date: 8/7/2024   Most Recent Discharge Diagnosis: Sepsis, due to unspecified organism, unspecified whether acute organ dysfunction present (H) - A41.9  Cough, unspecified type - R05.9  Shortness of breath - R06.02  Left hand pain - M79.642  Pleuritic chest pain - R07.81  Pneumonia of right lower lobe due to infectious organism - J18.9  Parapneumonic effusion - J18.9, J91.8     Transitions of Care Assessment    Discharge Assessment  How are you doing now that you are home?: \"I'm feeling a little bit better, taking the medications that were prescribed to me and I'm in bed resting\"  How are your symptoms? (Red Flag symptoms escalate to triage hotline per guidelines): Improved  Do you know how to contact your clinic care team if you have future questions or changes to your health status? : Yes  Does the patient have their discharge instructions? : Yes  Does the patient have questions regarding their discharge instructions? : No  Were you started on any new medications or were there changes to any of your previous medications? : Yes  Does the patient have all of their medications?: Yes  Do you have questions regarding any of your medications? : No  Do you have all of your needed medical supplies or equipment (DME)?  (i.e. oxygen tank, CPAP, cane, etc.): Yes    Post-op ([facility] Only)  If the patient had a surgery or procedure, do they have any questions for a nurse?: No    Follow up Plan     Discharge Follow-Up  Discharge follow up appointment scheduled in alignment with recommended follow up timeframe or Transitions of Risk Category? (Low = within 30 days; Moderate= within 14 " days; High= within 7 days): Yes  Discharge Follow Up Appointment Date: 08/20/24  Discharge Follow Up Appointment Scheduled with?: Specialty Care Provider    Future Appointments   Date Time Provider Department Center   8/20/2024  2:00 PM Alvaro Aly MD [facility]   8/23/2024  3:00 PM Kaushik Goncalves DO [facility]       Outpatient Plan as outlined on AVS reviewed with patient.    For any urgent concerns, please contact our 24 hour nurse triage line: [phone] ([phone])         Ofelia Oates  Community Health Worker  Connected Care Resource [facility]    *Connected Care Resource Team does NOT follow patient ongoing. Referrals are identified based on internal discharge reports and the outreach is to ensure patient has an understanding of their discharge instructions.

## 2024-08-10 ENCOUNTER — TELEPHONE (OUTPATIENT)
Dept: FAMILY MEDICINE | Facility: CLINIC | Age: 39
End: 2024-08-10
Payer: MEDICAID

## 2024-08-10 LAB — BACTERIA BLD CULT: NO GROWTH

## 2024-08-10 NOTE — TELEPHONE ENCOUNTER
Received page from Adonay at [phone]  Was informed that patient culture was positive for gram + cocci  Called back and acknowledged information.  Notably this information was previously noted prior to discharge and patient was discharged on appropriate antibiotic and had plan for PCP follow up in place on 8/23 and pulmonology follow up on 8/20.    8/10/2024  11:26 AM  Katheirn Whaley MD

## 2024-08-12 LAB
BACTERIA PLR CULT: ABNORMAL
BACTERIA PLR CULT: NORMAL
GRAM STAIN RESULT: ABNORMAL
GRAM STAIN RESULT: ABNORMAL

## 2024-08-15 ENCOUNTER — APPOINTMENT (OUTPATIENT)
Dept: INTERPRETER SERVICES | Facility: CLINIC | Age: 39
End: 2024-08-15
Payer: MEDICAID

## 2024-08-20 ENCOUNTER — OFFICE VISIT (OUTPATIENT)
Dept: PULMONOLOGY | Facility: CLINIC | Age: 39
End: 2024-08-20
Attending: STUDENT IN AN ORGANIZED HEALTH CARE EDUCATION/TRAINING PROGRAM
Payer: MEDICAID

## 2024-08-20 VITALS — HEART RATE: 66 BPM | SYSTOLIC BLOOD PRESSURE: 124 MMHG | OXYGEN SATURATION: 95 % | DIASTOLIC BLOOD PRESSURE: 77 MMHG

## 2024-08-20 DIAGNOSIS — J18.9 PARAPNEUMONIC EFFUSION: Primary | ICD-10-CM

## 2024-08-20 DIAGNOSIS — J91.8 PARAPNEUMONIC EFFUSION: Primary | ICD-10-CM

## 2024-08-20 PROCEDURE — 99204 OFFICE O/P NEW MOD 45 MIN: CPT | Performed by: STUDENT IN AN ORGANIZED HEALTH CARE EDUCATION/TRAINING PROGRAM

## 2024-08-20 PROCEDURE — G0463 HOSPITAL OUTPT CLINIC VISIT: HCPCS | Performed by: STUDENT IN AN ORGANIZED HEALTH CARE EDUCATION/TRAINING PROGRAM

## 2024-08-20 NOTE — LETTER
8/20/2024      Ramón Cruz  [address]  [facility] [address]      Dear Colleague,    Thank you for referring your patient, Ramón Cruz, to the [facility] [place]. Please see a copy of my visit note below.    Pulmonary Clinic Note    Date of Service: 8/20/2024     Chief Complaint   Patient presents with     Follow Up     Pulmonary follow up        A/P:  39M being seen for pleural effusion. Pleural effusion exudative by Light's criteria. This is consistent with parapneumonic effusion. Pt is improving as expected from pneumonia. This should continue to improve and effusion should be reabsorbed. No further pulmonary treatment or work up necessary. Advised to get repeat CT as previously ordered. For pain control he may use acetaminophen and/or ibuprofen. Complete ABx as prescribed.     History:  39M being seen for pleural effusion. Pt was admitted to [facility] 7/31-8/7 for multifocal pneumonia w/ hypoxemia and loculated pleural effusion. Seen by pulmonary service during that admission. Thoracentesis done this admission w/ exudative pleural effusion. Pleural fluid did grow GPCs but no further speciation. Infectious work up otherwise negative. He is doing well today. He is completing ABx tomorrow. No SOB at rest or MOODY. He does at times note R pleuritic chest pain w/ deep inspiration. Minimal cough. No F/C. Feels he is getting back to baseline, just noting pleuritic chest pain at times.     Smoking: former, ~10 pack year history, quit 7/2024     Recent travel: no                   Hx of incarceration: no      Bird exposure: no             Animal exposure: no        Inhalation exposure: no    Occupation: not currently                           10 point review of systems negative, aside from that mentioned in HPI.    /77 (BP Location: Right arm, Patient Position: Sitting, Cuff Size: Adult Regular)   Pulse 66   SpO2 95%   Gen: well-appearing  HEENT:  Mallampati IV  Card: RRR  Pulm: clear bilaterally   Abd: soft  MSK: no edema, no acute joint abnormality   Skin: no obvious rash  Psych: normal affect  Neuro: alert and oriented     Labs:  Personally reviewed    Imaging/Studies: Personally reviewed  CXR (8/2024) - stable large R pleural effusion   CTPE (7/2024) - no PE, scattered opacities, small loculated pleural effusion     PMH: reviewed and non-contributory   Past Surgical History:   Procedure Laterality Date     IR THORACENTESIS  8/5/2024     FHx: reviewed and non-contributory   Social History     Socioeconomic History     Marital status:      Spouse name: Not on file     Number of children: Not on file     Years of education: Not on file     Highest education level: Not on file   Occupational History     Not on file   Tobacco Use     Smoking status: Former     Types: Cigarettes     Passive exposure: Never     Smokeless tobacco: Never   Substance and Sexual Activity     Alcohol use: Not Currently     Drug use: Never     Sexual activity: Not on file   Other Topics Concern     Not on file   Social History Narrative     Not on file     Social Determinants of Health     Financial Resource Strain: High Risk (8/4/2024)    Financial Resource Strain      Within the past 12 months, have you or your family members you live with been unable to get utilities (heat, electricity) when it was really needed?: Yes   Food Insecurity: High Risk (8/4/2024)    Food Insecurity      Within the past 12 months, did you worry that your food would run out before you got money to buy more?: Yes      Within the past 12 months, did the food you bought just not last and you didn t have money to get more?: Yes   Transportation Needs: High Risk (8/4/2024)    Transportation Needs      Within the past 12 months, has lack of transportation kept you from medical appointments, getting your medicines, non-medical meetings or appointments, work, or from getting things that you need?: Yes    Physical Activity: Not on file   Stress: Stress Concern Present (8/4/2024)    [employer] - Occupational Stress Questionnaire      Feeling of Stress : Very much   Social Connections: Not on file   Interpersonal Safety: Low Risk  (8/4/2024)    Interpersonal Safety      Do you feel physically and emotionally safe where you currently live?: Yes      Within the past 12 months, have you been hit, slapped, kicked or otherwise physically hurt by someone?: Not on file      Within the past 12 months, have you been humiliated or emotionally abused in other ways by your partner or ex-partner?: Not on file   Housing Stability: High Risk (8/4/2024)    Housing Stability      Do you have housing? : Yes      Are you worried about losing your housing?: Yes       50 minutes spent reviewing chart, reviewing test results, talking with and examining patient, formulating plan, and documentation on the day of the encounter.    Alvaro Aly MD  Pulmonary and Critical Care Medicine  [facility]       Again, thank you for allowing me to participate in the care of your patient.        Sincerely,        Alvaro Aly MD

## 2024-08-20 NOTE — NURSING NOTE
Chief Complaint   Patient presents with    Follow Up     Pulmonary follow up      Vitals were taken and medications were reconciled.    Debra Lozano RMA  2:04 PM

## 2024-08-20 NOTE — PROGRESS NOTES
Pulmonary Clinic Note    Date of Service: 8/20/2024     Chief Complaint   Patient presents with    Follow Up     Pulmonary follow up        A/P:  39M being seen for pleural effusion. Pleural effusion exudative by Light's criteria. This is consistent with parapneumonic effusion. Pt is improving as expected from pneumonia. This should continue to improve and effusion should be reabsorbed. No further pulmonary treatment or work up necessary. Advised to get repeat CT as previously ordered. For pain control he may use acetaminophen and/or ibuprofen. Complete ABx as prescribed.     History:  39M being seen for pleural effusion. Pt was admitted to [facility] 7/31-8/7 for multifocal pneumonia w/ hypoxemia and loculated pleural effusion. Seen by pulmonary service during that admission. Thoracentesis done this admission w/ exudative pleural effusion. Pleural fluid did grow GPCs but no further speciation. Infectious work up otherwise negative. He is doing well today. He is completing ABx tomorrow. No SOB at rest or MOODY. He does at times note R pleuritic chest pain w/ deep inspiration. Minimal cough. No F/C. Feels he is getting back to baseline, just noting pleuritic chest pain at times.     Smoking: former, ~10 pack year history, quit 7/2024     Recent travel: no                   Hx of incarceration: no      Bird exposure: no             Animal exposure: no        Inhalation exposure: no    Occupation: not currently                           10 point review of systems negative, aside from that mentioned in HPI.    /77 (BP Location: Right arm, Patient Position: Sitting, Cuff Size: Adult Regular)   Pulse 66   SpO2 95%   Gen: well-appearing  HEENT: Mallampati IV  Card: RRR  Pulm: clear bilaterally   Abd: soft  MSK: no edema, no acute joint abnormality   Skin: no obvious rash  Psych: normal affect  Neuro: alert and oriented     Labs:  Personally reviewed    Imaging/Studies: Personally reviewed  CXR (8/2024) - stable large R  pleural effusion   CTPE (7/2024) - no PE, scattered opacities, small loculated pleural effusion     PMH: reviewed and non-contributory   Past Surgical History:   Procedure Laterality Date    IR THORACENTESIS  8/5/2024     FHx: reviewed and non-contributory   Social History     Socioeconomic History    Marital status:      Spouse name: Not on file    Number of children: Not on file    Years of education: Not on file    Highest education level: Not on file   Occupational History    Not on file   Tobacco Use    Smoking status: Former     Types: Cigarettes     Passive exposure: Never    Smokeless tobacco: Never   Substance and Sexual Activity    Alcohol use: Not Currently    Drug use: Never    Sexual activity: Not on file   Other Topics Concern    Not on file   Social History Narrative    Not on file     Social Determinants of Health     Financial Resource Strain: High Risk (8/4/2024)    Financial Resource Strain     Within the past 12 months, have you or your family members you live with been unable to get utilities (heat, electricity) when it was really needed?: Yes   Food Insecurity: High Risk (8/4/2024)    Food Insecurity     Within the past 12 months, did you worry that your food would run out before you got money to buy more?: Yes     Within the past 12 months, did the food you bought just not last and you didn t have money to get more?: Yes   Transportation Needs: High Risk (8/4/2024)    Transportation Needs     Within the past 12 months, has lack of transportation kept you from medical appointments, getting your medicines, non-medical meetings or appointments, work, or from getting things that you need?: Yes   Physical Activity: Not on file   Stress: Stress Concern Present (8/4/2024)    [employer] - Occupational Stress Questionnaire     Feeling of Stress : Very much   Social Connections: Not on file   Interpersonal Safety: Low Risk  (8/4/2024)    Interpersonal Safety     Do  you feel physically and emotionally safe where you currently live?: Yes     Within the past 12 months, have you been hit, slapped, kicked or otherwise physically hurt by someone?: Not on file     Within the past 12 months, have you been humiliated or emotionally abused in other ways by your partner or ex-partner?: Not on file   Housing Stability: High Risk (8/4/2024)    Housing Stability     Do you have housing? : Yes     Are you worried about losing your housing?: Yes       50 minutes spent reviewing chart, reviewing test results, talking with and examining patient, formulating plan, and documentation on the day of the encounter.    Alvaro Aly MD  Pulmonary and Critical Care Medicine  [facility]

## 2024-08-20 NOTE — PATIENT INSTRUCTIONS
Your CT shows evidence of pneumonia and fluid around the lung which is common. We do not need to do anything specific at this time. For chest pain you may use ibuprofen or Tylenol. Please get your repeat CT scan as scheduled.

## 2024-08-21 ENCOUNTER — TELEPHONE (OUTPATIENT)
Dept: FAMILY MEDICINE | Facility: CLINIC | Age: 39
End: 2024-08-21
Payer: MEDICAID

## 2024-08-23 ENCOUNTER — OFFICE VISIT (OUTPATIENT)
Dept: FAMILY MEDICINE | Facility: CLINIC | Age: 39
End: 2024-08-23
Payer: MEDICAID

## 2024-08-23 VITALS
HEIGHT: 65 IN | DIASTOLIC BLOOD PRESSURE: 69 MMHG | BODY MASS INDEX: 33.49 KG/M2 | HEART RATE: 66 BPM | WEIGHT: 201 LBS | RESPIRATION RATE: 20 BRPM | SYSTOLIC BLOOD PRESSURE: 114 MMHG | OXYGEN SATURATION: 96 % | TEMPERATURE: 97.9 F

## 2024-08-23 DIAGNOSIS — J91.8 PARAPNEUMONIC EFFUSION: ICD-10-CM

## 2024-08-23 DIAGNOSIS — N12 PYELONEPHRITIS: ICD-10-CM

## 2024-08-23 DIAGNOSIS — J18.9 COMMUNITY ACQUIRED PNEUMONIA, UNSPECIFIED LATERALITY: ICD-10-CM

## 2024-08-23 DIAGNOSIS — J18.9 PARAPNEUMONIC EFFUSION: ICD-10-CM

## 2024-08-23 DIAGNOSIS — Z13.9 ENCOUNTER FOR SCREENING INVOLVING SOCIAL DETERMINANTS OF HEALTH (SDOH): Primary | ICD-10-CM

## 2024-08-23 DIAGNOSIS — Z11.59 NEED FOR HEPATITIS C SCREENING TEST: ICD-10-CM

## 2024-08-23 PROCEDURE — 99495 TRANSJ CARE MGMT MOD F2F 14D: CPT | Performed by: FAMILY MEDICINE

## 2024-08-23 PROCEDURE — 80048 BASIC METABOLIC PNL TOTAL CA: CPT | Performed by: FAMILY MEDICINE

## 2024-08-23 PROCEDURE — T1013 SIGN LANG/ORAL INTERPRETER: HCPCS | Mod: U4

## 2024-08-23 ASSESSMENT — PAIN SCALES - GENERAL: PAINLEVEL: MODERATE PAIN (4)

## 2024-08-23 NOTE — PROGRESS NOTES
"  Assessment & Plan     Parapneumonic effusion  improved  - Primary Care Referral    Encounter for screening involving social determinants of health (SDoH)      Pyelonephritis  Improved   - Basic metabolic panel  (Ca, Cl, CO2, Creat, Gluc, K, Na, BUN); Future  - UA Macroscopic with reflex to Microscopic and Culture - Lab Collect; Future    Community acquired pneumonia, unspecified laterality  Improved    - CBC with platelets and differential; Future        MED REC REQUIRED  Post Medication Reconciliation Status: discharge medications reconciled, continue medications without change  BMI  Estimated body mass index is 33.49 kg/m  as calculated from the following:    Height as of this encounter: 1.65 m (5' 4.96\").    Weight as of this encounter: 91.2 kg (201 lb).       Peter Melgar is a 39 year old, presenting for the following health issues:  Hospital F/U      8/23/2024     2:49 PM   Additional Questions   Roomed by Jennifer Guzmán     HPI     Doing better     Saw pulmonology for follow up  Will be getting a new image in early September     Pneumonia with effusion and pyelonephritis   On augmentin for 3 weeks         Hospital Follow-up Visit:    Hospital/Nursing Home/IP Rehab Facility: [facility]  Date of Admission: 7/31/24  Date of Discharge: 8/7/24  Reason(s) for Admission: pneumonia  Was the patient in the ICU or did the patient experience delirium during hospitalization?  No  Do you have any other stressors you would like to discuss with your provider? No    Problems taking medications regularly:  None  Medication changes since discharge: Pt has finished the medication prescribed during the hospitalization  Problems adhering to non-medication therapy:  None    Summary of hospitalization:  [facility] discharge summary reviewed  Diagnostic Tests/Treatments reviewed.  Follow up needed: none  Other Healthcare Providers Involved in Patient s Care:         " "None  Update since discharge: improved.         Plan of care communicated with patient                   Objective    /69   Pulse 66   Temp 97.9  F (36.6  C) (Temporal)   Resp 20   Ht 1.65 m (5' 4.96\")   Wt 91.2 kg (201 lb)   SpO2 96%   BMI 33.49 kg/m    Body mass index is 33.49 kg/m .  Physical Exam  Constitutional:       General: He is not in acute distress.  Eyes:      General: No scleral icterus.  Cardiovascular:      Rate and Rhythm: Normal rate and regular rhythm.      Heart sounds: Normal heart sounds.   Pulmonary:      Effort: No respiratory distress.      Breath sounds: Normal breath sounds.   Neurological:      Mental Status: He is alert.   Psychiatric:         Mood and Affect: Mood normal.         Behavior: Behavior normal.                Signed Electronically by: Kaushik Goncalves DO    " [Normocephalic] : normocephalic [EOMI] : extra ocular movement intact [Supple] : supple [No Supraclavicular Adenopathy] : no supraclavicular adenopathy [No Cervical Adenopathy] : no cervical adenopathy [de-identified] : Bilateral breast/axilla/supraclavicular area: No masses, discharge, or adenopathy\par

## 2024-09-02 LAB — BACTERIA PLR CULT: NO GROWTH

## 2024-09-25 ENCOUNTER — APPOINTMENT (OUTPATIENT)
Dept: INTERPRETER SERVICES | Facility: CLINIC | Age: 39
End: 2024-09-25
Payer: MEDICAID

## (undated) RX ORDER — LIDOCAINE HYDROCHLORIDE 10 MG/ML
INJECTION, SOLUTION EPIDURAL; INFILTRATION; INTRACAUDAL; PERINEURAL
Status: DISPENSED
Start: 2024-08-05